# Patient Record
Sex: MALE | Race: WHITE | NOT HISPANIC OR LATINO | Employment: OTHER | URBAN - METROPOLITAN AREA
[De-identification: names, ages, dates, MRNs, and addresses within clinical notes are randomized per-mention and may not be internally consistent; named-entity substitution may affect disease eponyms.]

---

## 2019-03-06 ENCOUNTER — AMBULATORY - HEALTHEAST (OUTPATIENT)
Dept: CARDIAC REHAB | Facility: HOSPITAL | Age: 69
End: 2019-03-06

## 2019-03-06 DIAGNOSIS — Z95.1 S/P CABG (CORONARY ARTERY BYPASS GRAFT): ICD-10-CM

## 2019-03-12 ENCOUNTER — AMBULATORY - HEALTHEAST (OUTPATIENT)
Dept: CARDIAC REHAB | Facility: HOSPITAL | Age: 69
End: 2019-03-12

## 2019-03-12 ENCOUNTER — COMMUNICATION - HEALTHEAST (OUTPATIENT)
Dept: TELEHEALTH | Facility: CLINIC | Age: 69
End: 2019-03-12

## 2019-03-12 DIAGNOSIS — Z95.1 S/P CABG (CORONARY ARTERY BYPASS GRAFT): ICD-10-CM

## 2019-03-12 RX ORDER — NITROGLYCERIN 0.4 MG/1
0.4 TABLET SUBLINGUAL EVERY 5 MIN PRN
Status: SHIPPED | COMMUNITY
Start: 2019-03-12

## 2019-03-12 RX ORDER — METFORMIN HCL 500 MG
1000 TABLET, EXTENDED RELEASE 24 HR ORAL
Status: SHIPPED | COMMUNITY
Start: 2019-03-12

## 2019-03-12 RX ORDER — ASPIRIN 81 MG/1
81 TABLET, CHEWABLE ORAL
Status: SHIPPED | COMMUNITY
Start: 2019-03-12

## 2019-03-12 RX ORDER — CETIRIZINE HYDROCHLORIDE 10 MG/1
10 TABLET ORAL DAILY PRN
Status: SHIPPED | COMMUNITY
Start: 2019-03-12

## 2019-03-12 RX ORDER — CLOPIDOGREL BISULFATE 75 MG/1
75 TABLET ORAL
Status: SHIPPED | COMMUNITY
Start: 2019-03-12

## 2019-03-12 RX ORDER — ACETAMINOPHEN 325 MG/1
325-650 TABLET ORAL EVERY 4 HOURS PRN
Status: SHIPPED | COMMUNITY
Start: 2019-03-12

## 2019-03-12 RX ORDER — NICOTINE 21 MG/24HR
1 PATCH, TRANSDERMAL 24 HOURS TRANSDERMAL EVERY 24 HOURS
Status: SHIPPED | COMMUNITY
Start: 2019-03-12

## 2019-03-12 RX ORDER — ALBUTEROL SULFATE 90 UG/1
2 AEROSOL, METERED RESPIRATORY (INHALATION) EVERY 6 HOURS PRN
Status: SHIPPED | COMMUNITY
Start: 2019-03-12

## 2019-03-13 ENCOUNTER — AMBULATORY - HEALTHEAST (OUTPATIENT)
Dept: CARDIAC REHAB | Facility: HOSPITAL | Age: 69
End: 2019-03-13

## 2019-03-13 DIAGNOSIS — Z95.1 S/P CABG (CORONARY ARTERY BYPASS GRAFT): ICD-10-CM

## 2019-03-18 ENCOUNTER — AMBULATORY - HEALTHEAST (OUTPATIENT)
Dept: CARDIAC REHAB | Facility: HOSPITAL | Age: 69
End: 2019-03-18

## 2019-03-18 DIAGNOSIS — Z95.1 S/P CABG (CORONARY ARTERY BYPASS GRAFT): ICD-10-CM

## 2019-03-25 ENCOUNTER — AMBULATORY - HEALTHEAST (OUTPATIENT)
Dept: CARDIAC REHAB | Facility: HOSPITAL | Age: 69
End: 2019-03-25

## 2019-03-25 DIAGNOSIS — Z95.1 S/P CABG (CORONARY ARTERY BYPASS GRAFT): ICD-10-CM

## 2019-03-27 ENCOUNTER — AMBULATORY - HEALTHEAST (OUTPATIENT)
Dept: CARDIAC REHAB | Facility: HOSPITAL | Age: 69
End: 2019-03-27

## 2019-03-27 DIAGNOSIS — Z95.1 S/P CABG (CORONARY ARTERY BYPASS GRAFT): ICD-10-CM

## 2019-04-01 ENCOUNTER — AMBULATORY - HEALTHEAST (OUTPATIENT)
Dept: CARDIAC REHAB | Facility: HOSPITAL | Age: 69
End: 2019-04-01

## 2019-04-01 DIAGNOSIS — Z95.1 S/P CABG (CORONARY ARTERY BYPASS GRAFT): ICD-10-CM

## 2019-04-03 ENCOUNTER — AMBULATORY - HEALTHEAST (OUTPATIENT)
Dept: CARDIAC REHAB | Facility: HOSPITAL | Age: 69
End: 2019-04-03

## 2019-04-03 DIAGNOSIS — Z95.1 S/P CABG (CORONARY ARTERY BYPASS GRAFT): ICD-10-CM

## 2019-04-08 ENCOUNTER — AMBULATORY - HEALTHEAST (OUTPATIENT)
Dept: CARDIAC REHAB | Facility: HOSPITAL | Age: 69
End: 2019-04-08

## 2019-04-08 DIAGNOSIS — Z95.1 S/P CABG (CORONARY ARTERY BYPASS GRAFT): ICD-10-CM

## 2019-04-10 ENCOUNTER — AMBULATORY - HEALTHEAST (OUTPATIENT)
Dept: CARDIAC REHAB | Facility: HOSPITAL | Age: 69
End: 2019-04-10

## 2019-04-10 DIAGNOSIS — Z95.1 S/P CABG (CORONARY ARTERY BYPASS GRAFT): ICD-10-CM

## 2019-04-15 ENCOUNTER — AMBULATORY - HEALTHEAST (OUTPATIENT)
Dept: CARDIAC REHAB | Facility: HOSPITAL | Age: 69
End: 2019-04-15

## 2019-04-15 DIAGNOSIS — Z95.1 S/P CABG (CORONARY ARTERY BYPASS GRAFT): ICD-10-CM

## 2019-04-17 ENCOUNTER — AMBULATORY - HEALTHEAST (OUTPATIENT)
Dept: CARDIAC REHAB | Facility: HOSPITAL | Age: 69
End: 2019-04-17

## 2019-04-17 DIAGNOSIS — Z95.1 S/P CABG X 3: ICD-10-CM

## 2019-04-18 ENCOUNTER — SURGERY - HEALTHEAST (OUTPATIENT)
Dept: CARDIOLOGY | Facility: CLINIC | Age: 69
End: 2019-04-18

## 2019-04-18 ASSESSMENT — MIFFLIN-ST. JEOR: SCORE: 1592.26

## 2019-04-19 ASSESSMENT — MIFFLIN-ST. JEOR: SCORE: 1564.59

## 2019-04-20 ASSESSMENT — MIFFLIN-ST. JEOR: SCORE: 1557.79

## 2019-04-24 ENCOUNTER — AMBULATORY - HEALTHEAST (OUTPATIENT)
Dept: CARDIAC REHAB | Facility: HOSPITAL | Age: 69
End: 2019-04-24

## 2019-04-24 DIAGNOSIS — Z95.5 S/P CORONARY ARTERY STENT PLACEMENT: ICD-10-CM

## 2019-04-24 DIAGNOSIS — Z95.5 STENTED CORONARY ARTERY: ICD-10-CM

## 2019-04-29 ENCOUNTER — AMBULATORY - HEALTHEAST (OUTPATIENT)
Dept: CARDIAC REHAB | Facility: HOSPITAL | Age: 69
End: 2019-04-29

## 2019-04-29 ENCOUNTER — OFFICE VISIT - HEALTHEAST (OUTPATIENT)
Dept: CARDIOLOGY | Facility: CLINIC | Age: 69
End: 2019-04-29

## 2019-04-29 DIAGNOSIS — E11.9 TYPE 2 DIABETES MELLITUS WITHOUT COMPLICATION, WITHOUT LONG-TERM CURRENT USE OF INSULIN (H): ICD-10-CM

## 2019-04-29 DIAGNOSIS — I50.22 CHRONIC SYSTOLIC HEART FAILURE (H): ICD-10-CM

## 2019-04-29 DIAGNOSIS — I25.5 ISCHEMIC CARDIOMYOPATHY: ICD-10-CM

## 2019-04-29 DIAGNOSIS — I25.83 CORONARY ARTERY DISEASE DUE TO LIPID RICH PLAQUE: ICD-10-CM

## 2019-04-29 DIAGNOSIS — I47.29 NONSUSTAINED VENTRICULAR TACHYCARDIA (H): ICD-10-CM

## 2019-04-29 DIAGNOSIS — I10 ESSENTIAL HYPERTENSION: ICD-10-CM

## 2019-04-29 DIAGNOSIS — Z95.1 S/P CABG X 3: ICD-10-CM

## 2019-04-29 DIAGNOSIS — Z95.5 S/P CORONARY ARTERY STENT PLACEMENT: ICD-10-CM

## 2019-04-29 DIAGNOSIS — I21.4 ACUTE NON-ST ELEVATION MYOCARDIAL INFARCTION (NSTEMI) (H): ICD-10-CM

## 2019-04-29 DIAGNOSIS — E78.5 DYSLIPIDEMIA, GOAL LDL BELOW 70: ICD-10-CM

## 2019-04-29 DIAGNOSIS — I25.10 CORONARY ARTERY DISEASE DUE TO LIPID RICH PLAQUE: ICD-10-CM

## 2019-04-29 ASSESSMENT — MIFFLIN-ST. JEOR: SCORE: 1599.07

## 2019-05-01 ENCOUNTER — AMBULATORY - HEALTHEAST (OUTPATIENT)
Dept: CARDIAC REHAB | Facility: HOSPITAL | Age: 69
End: 2019-05-01

## 2019-05-01 DIAGNOSIS — Z95.5 STENTED CORONARY ARTERY: ICD-10-CM

## 2019-05-01 DIAGNOSIS — I21.4 NSTEMI (NON-ST ELEVATED MYOCARDIAL INFARCTION) (H): ICD-10-CM

## 2019-05-01 DIAGNOSIS — Z95.1 S/P CABG X 3: ICD-10-CM

## 2019-05-01 RX ORDER — METOPROLOL SUCCINATE 25 MG/1
25 TABLET, EXTENDED RELEASE ORAL AT BEDTIME
Status: SHIPPED | COMMUNITY
Start: 2019-05-01

## 2019-05-06 ENCOUNTER — AMBULATORY - HEALTHEAST (OUTPATIENT)
Dept: CARDIAC REHAB | Facility: HOSPITAL | Age: 69
End: 2019-05-06

## 2019-05-06 DIAGNOSIS — I21.4 NSTEMI (NON-ST ELEVATED MYOCARDIAL INFARCTION) (H): ICD-10-CM

## 2019-05-06 DIAGNOSIS — Z95.1 S/P CABG X 3: ICD-10-CM

## 2019-05-06 DIAGNOSIS — Z95.5 STENTED CORONARY ARTERY: ICD-10-CM

## 2019-05-08 ENCOUNTER — AMBULATORY - HEALTHEAST (OUTPATIENT)
Dept: CARDIAC REHAB | Facility: HOSPITAL | Age: 69
End: 2019-05-08

## 2019-05-08 DIAGNOSIS — Z95.1 S/P CABG X 3: ICD-10-CM

## 2019-05-08 DIAGNOSIS — I21.4 NSTEMI (NON-ST ELEVATED MYOCARDIAL INFARCTION) (H): ICD-10-CM

## 2019-05-08 DIAGNOSIS — Z95.5 STENTED CORONARY ARTERY: ICD-10-CM

## 2019-05-13 ENCOUNTER — AMBULATORY - HEALTHEAST (OUTPATIENT)
Dept: CARDIAC REHAB | Facility: HOSPITAL | Age: 69
End: 2019-05-13

## 2019-05-13 DIAGNOSIS — Z95.1 S/P CABG X 3: ICD-10-CM

## 2019-05-13 DIAGNOSIS — I21.4 NSTEMI (NON-ST ELEVATED MYOCARDIAL INFARCTION) (H): ICD-10-CM

## 2019-05-13 DIAGNOSIS — Z95.5 STENTED CORONARY ARTERY: ICD-10-CM

## 2019-05-15 ENCOUNTER — AMBULATORY - HEALTHEAST (OUTPATIENT)
Dept: CARDIAC REHAB | Facility: HOSPITAL | Age: 69
End: 2019-05-15

## 2019-05-15 DIAGNOSIS — Z95.5 STENTED CORONARY ARTERY: ICD-10-CM

## 2019-05-15 DIAGNOSIS — Z95.1 S/P CABG X 3: ICD-10-CM

## 2019-05-15 DIAGNOSIS — I21.4 NSTEMI (NON-ST ELEVATED MYOCARDIAL INFARCTION) (H): ICD-10-CM

## 2019-05-20 ENCOUNTER — AMBULATORY - HEALTHEAST (OUTPATIENT)
Dept: CARDIAC REHAB | Facility: HOSPITAL | Age: 69
End: 2019-05-20

## 2019-05-20 DIAGNOSIS — Z95.5 STENTED CORONARY ARTERY: ICD-10-CM

## 2019-05-20 DIAGNOSIS — Z95.1 S/P CABG X 3: ICD-10-CM

## 2019-05-20 DIAGNOSIS — I21.4 NSTEMI (NON-ST ELEVATED MYOCARDIAL INFARCTION) (H): ICD-10-CM

## 2019-05-22 ENCOUNTER — AMBULATORY - HEALTHEAST (OUTPATIENT)
Dept: CARDIAC REHAB | Facility: HOSPITAL | Age: 69
End: 2019-05-22

## 2019-05-22 DIAGNOSIS — I21.4 NSTEMI (NON-ST ELEVATED MYOCARDIAL INFARCTION) (H): ICD-10-CM

## 2019-05-22 DIAGNOSIS — Z95.1 S/P CABG X 3: ICD-10-CM

## 2019-05-22 DIAGNOSIS — Z95.5 STENTED CORONARY ARTERY: ICD-10-CM

## 2019-05-29 ENCOUNTER — AMBULATORY - HEALTHEAST (OUTPATIENT)
Dept: CARDIAC REHAB | Facility: HOSPITAL | Age: 69
End: 2019-05-29

## 2019-05-29 DIAGNOSIS — I21.4 NSTEMI (NON-ST ELEVATED MYOCARDIAL INFARCTION) (H): ICD-10-CM

## 2019-05-29 DIAGNOSIS — Z95.1 S/P CABG X 3: ICD-10-CM

## 2019-06-03 ENCOUNTER — AMBULATORY - HEALTHEAST (OUTPATIENT)
Dept: CARDIAC REHAB | Facility: HOSPITAL | Age: 69
End: 2019-06-03

## 2019-06-03 DIAGNOSIS — Z95.1 S/P CABG X 3: ICD-10-CM

## 2019-06-03 DIAGNOSIS — I21.4 NSTEMI (NON-ST ELEVATED MYOCARDIAL INFARCTION) (H): ICD-10-CM

## 2019-06-05 ENCOUNTER — AMBULATORY - HEALTHEAST (OUTPATIENT)
Dept: CARDIAC REHAB | Facility: HOSPITAL | Age: 69
End: 2019-06-05

## 2019-06-05 DIAGNOSIS — Z95.5 STENTED CORONARY ARTERY: ICD-10-CM

## 2019-06-05 DIAGNOSIS — Z95.1 S/P CABG X 3: ICD-10-CM

## 2019-06-05 DIAGNOSIS — I21.4 NSTEMI (NON-ST ELEVATED MYOCARDIAL INFARCTION) (H): ICD-10-CM

## 2019-06-10 ENCOUNTER — AMBULATORY - HEALTHEAST (OUTPATIENT)
Dept: CARDIAC REHAB | Facility: HOSPITAL | Age: 69
End: 2019-06-10

## 2019-06-10 DIAGNOSIS — Z95.5 STENTED CORONARY ARTERY: ICD-10-CM

## 2019-06-10 DIAGNOSIS — Z95.1 S/P CABG X 3: ICD-10-CM

## 2019-06-10 DIAGNOSIS — I21.4 NSTEMI (NON-ST ELEVATED MYOCARDIAL INFARCTION) (H): ICD-10-CM

## 2019-06-12 ENCOUNTER — AMBULATORY - HEALTHEAST (OUTPATIENT)
Dept: CARDIAC REHAB | Facility: HOSPITAL | Age: 69
End: 2019-06-12

## 2019-06-12 DIAGNOSIS — Z95.5 STENTED CORONARY ARTERY: ICD-10-CM

## 2019-06-12 DIAGNOSIS — I21.4 NSTEMI (NON-ST ELEVATED MYOCARDIAL INFARCTION) (H): ICD-10-CM

## 2019-06-12 DIAGNOSIS — Z95.1 S/P CABG X 3: ICD-10-CM

## 2019-06-17 ENCOUNTER — AMBULATORY - HEALTHEAST (OUTPATIENT)
Dept: CARDIAC REHAB | Facility: HOSPITAL | Age: 69
End: 2019-06-17

## 2019-06-17 DIAGNOSIS — Z95.5 STENTED CORONARY ARTERY: ICD-10-CM

## 2019-06-17 DIAGNOSIS — I21.4 NSTEMI (NON-ST ELEVATED MYOCARDIAL INFARCTION) (H): ICD-10-CM

## 2019-06-17 DIAGNOSIS — Z95.1 S/P CABG X 3: ICD-10-CM

## 2019-06-19 ENCOUNTER — AMBULATORY - HEALTHEAST (OUTPATIENT)
Dept: CARDIAC REHAB | Facility: HOSPITAL | Age: 69
End: 2019-06-19

## 2019-06-19 DIAGNOSIS — Z95.1 S/P CABG X 3: ICD-10-CM

## 2019-06-19 DIAGNOSIS — Z95.5 STENTED CORONARY ARTERY: ICD-10-CM

## 2019-06-19 DIAGNOSIS — I21.4 NSTEMI (NON-ST ELEVATED MYOCARDIAL INFARCTION) (H): ICD-10-CM

## 2019-06-24 ENCOUNTER — AMBULATORY - HEALTHEAST (OUTPATIENT)
Dept: CARDIAC REHAB | Facility: HOSPITAL | Age: 69
End: 2019-06-24

## 2019-06-24 DIAGNOSIS — Z95.1 S/P CABG X 3: ICD-10-CM

## 2019-06-26 ENCOUNTER — AMBULATORY - HEALTHEAST (OUTPATIENT)
Dept: CARDIAC REHAB | Facility: HOSPITAL | Age: 69
End: 2019-06-26

## 2019-06-26 DIAGNOSIS — Z95.1 S/P CABG X 3: ICD-10-CM

## 2019-06-26 DIAGNOSIS — I21.4 NSTEMI (NON-ST ELEVATED MYOCARDIAL INFARCTION) (H): ICD-10-CM

## 2019-06-26 DIAGNOSIS — Z95.5 STENTED CORONARY ARTERY: ICD-10-CM

## 2019-07-01 ENCOUNTER — AMBULATORY - HEALTHEAST (OUTPATIENT)
Dept: CARDIAC REHAB | Facility: HOSPITAL | Age: 69
End: 2019-07-01

## 2019-07-01 DIAGNOSIS — I21.4 NSTEMI (NON-ST ELEVATED MYOCARDIAL INFARCTION) (H): ICD-10-CM

## 2019-07-01 DIAGNOSIS — Z95.5 STENTED CORONARY ARTERY: ICD-10-CM

## 2019-07-01 DIAGNOSIS — Z95.1 S/P CABG X 3: ICD-10-CM

## 2019-07-03 ENCOUNTER — AMBULATORY - HEALTHEAST (OUTPATIENT)
Dept: CARDIAC REHAB | Facility: HOSPITAL | Age: 69
End: 2019-07-03

## 2019-07-03 DIAGNOSIS — Z95.5 STENTED CORONARY ARTERY: ICD-10-CM

## 2019-07-03 DIAGNOSIS — I21.4 NSTEMI (NON-ST ELEVATED MYOCARDIAL INFARCTION) (H): ICD-10-CM

## 2019-07-08 ENCOUNTER — AMBULATORY - HEALTHEAST (OUTPATIENT)
Dept: CARDIAC REHAB | Facility: HOSPITAL | Age: 69
End: 2019-07-08

## 2019-07-08 DIAGNOSIS — Z95.1 S/P CABG X 3: ICD-10-CM

## 2019-07-10 ENCOUNTER — AMBULATORY - HEALTHEAST (OUTPATIENT)
Dept: CARDIAC REHAB | Facility: HOSPITAL | Age: 69
End: 2019-07-10

## 2019-07-10 DIAGNOSIS — I25.10 CAD (CORONARY ARTERY DISEASE): ICD-10-CM

## 2019-07-10 DIAGNOSIS — Z95.1 S/P CABG X 3: ICD-10-CM

## 2019-07-15 ENCOUNTER — AMBULATORY - HEALTHEAST (OUTPATIENT)
Dept: CARDIAC REHAB | Facility: HOSPITAL | Age: 69
End: 2019-07-15

## 2019-07-15 DIAGNOSIS — Z95.1 S/P CABG X 3: ICD-10-CM

## 2019-07-15 DIAGNOSIS — I21.4 NSTEMI (NON-ST ELEVATED MYOCARDIAL INFARCTION) (H): ICD-10-CM

## 2019-07-15 DIAGNOSIS — Z95.5 STENTED CORONARY ARTERY: ICD-10-CM

## 2019-07-22 ENCOUNTER — AMBULATORY - HEALTHEAST (OUTPATIENT)
Dept: CARDIAC REHAB | Facility: HOSPITAL | Age: 69
End: 2019-07-22

## 2019-07-22 DIAGNOSIS — I21.4 NSTEMI (NON-ST ELEVATED MYOCARDIAL INFARCTION) (H): ICD-10-CM

## 2019-07-22 DIAGNOSIS — Z95.1 S/P CABG X 3: ICD-10-CM

## 2019-08-01 ENCOUNTER — AMBULATORY - HEALTHEAST (OUTPATIENT)
Dept: CARDIAC REHAB | Facility: HOSPITAL | Age: 69
End: 2019-08-01

## 2019-08-01 DIAGNOSIS — I25.10 CAD (CORONARY ARTERY DISEASE): ICD-10-CM

## 2021-05-25 ENCOUNTER — RECORDS - HEALTHEAST (OUTPATIENT)
Dept: ADMINISTRATIVE | Facility: CLINIC | Age: 71
End: 2021-05-25

## 2021-05-26 ENCOUNTER — RECORDS - HEALTHEAST (OUTPATIENT)
Dept: ADMINISTRATIVE | Facility: CLINIC | Age: 71
End: 2021-05-26

## 2021-05-27 NOTE — PROGRESS NOTES
04/17/19 1100   Nustep   Symptoms Exercise stopped to see the dietitian and during the dietitian consult pt had 11 second run of VT, without sx's./67 and O2 sat was98%.  Pt now reports he had a episode of SOB and chest discomfort this am. Code 9 called and pt was sent to the ED for further evaluation. Strips scanned into Epic.   See Doc Flowsheet

## 2021-05-27 NOTE — PROGRESS NOTES
ITP ASSESSMENT   Assessment Day: 30 Day    Session Number: 9  Precautions: sternal precautions    Diagnosis: CAB    Risk Stratification: High    Referring Provider: Parker Archer MD  EXERCISE  Exercise Assessment: Reassessment       6 Minute Walk Test   Pre   Pre Exercise HR: 64                    Pre Exercise BP: 103/60      Peak  Peak HR: 68                   Peak BP: 164/79    Peak feet: 600    Peak O2 SAT: 96    Peak RPE: 12    Peak MPH: 1.14      Symptoms:  Peak Symptoms: right hip pain      5 mins. Post  5 Min Post HR: 58    5 Min Post BP: 149/68                           Exercise Plan  Goals Next 30 days  ADL'S: Pt. will  yard, spring cleaning following surgical precautions.    Leisure: [place] and tolerate a day at the [employer]. Pt. will tolerated increased U/E activity after 4/19/19.      Education Goals: All goals in this section met    Education Goals Met: Patient can state cardiac s/s and appropriate emergency response.;Has system for taking medication.;Medication review.                          Goals Met  Initial ADL's goals met: Pt. has returned to driving.    Initial Leisure goals met: Pt. is able to tolerate 2-3 flights slowly without sx.    No data recorded  Initial Progression: Pt. is tolerating a 3 met level. He is feeling much better with the discontinuation of many meds.      Exercise Prescription  Exercise Mode: Treadmill;Bike;Nustep;Hallway Walking    Frequency: 2-3x/week    Duration: 40-45 min    Intensity / THR: 20-30 beats above resting heart rate    RPE 11-14  Progression / Met level: 4-5    Resistive Training?: Yes (will begin after 4/19/19)      Current Exercise (mins/week): 150      Interventions  Home Exercise:  Mode: walking    Frequency: daily    Duration: 30 min. daily      Education Material : Educational videos;Provide written material;Individual education and counseling;Offer educational classes      Education Completed  Exercise  "Education Completed: Signs and Symptoms;Medication review;RPE;Warm up/cool down;BP/HR Reponse to exercise;Benefits of Exercise;End point of exercise;Emergency Plan              Exercise Follow-up/Discharge  Follow up/Discharge: Pt. states he will walk daily, stating its much easier with the weather being nice.           NUTRITION  Nutrition Assessment: Reassessment      Nutrition Risk Factors:  Nutrition Risk Factors: Diabetes;Dyslipidemia  HbA1c: 6.6  Monitors blood sugar at home: No  Cholesterol: NA  LDL: 79  HDL: NA  Triglycerides: NA      Nutrition Plan  Interventions  No data recorded  Other Nutrition Intervention: Diet Class;Therapist/Pt Discussion;Educational Videos;Provide with Written Material      Education Completed  Nutrition Education Completed: Risk factor overview      Goals  Nutrition Goals (Next 30 days): Patient will follow a low saturated fat diet;Patient will follow a low sodium diet      Goals Met  Nutrition Goals Met: Patient can identify their risk factors for CAD;Completed Nutritional Risk Screen;Provided Rate your Plate Survey;Reviewed Dietitian schedule;Patient knows appropriate portion size      Height, Weight, and  BMI  Weight: 174 lb (78.9 kg)  Height: 6' 1\" (1.854 m)  BMI: 22.96      Nutrition Follow-up  Follow-up/Discharge: Pt. will meet with dietitian .       Other Risk Factors  Other Risk Factor Assessment: Reassessment      HTN Risk Factor: Hypertension      Pre Exercise BP: 106/60  Post Exercise BP: 110/60      Hypertension Plan  Goals  HTN Goals: Follow low sodium diet      Goals Met  HTN Goals Met: Exercises regularly;Take medication as prescribed      HTN Interventions  HTN Interventions: Diet consult;Provide written material;Therapist/patient discussion;Offer educational videos;Offer educational classes      HTN Education Completed  HTN Education Completed: Risk factor overview      Tobacco Risk Factor: Tobacco      Initial Use:: 1 ppd  Current Use:: 0      Tobacco " Plan  Tobacco Goals  Tobacco Goals: Patient remain tobacco free      Goals Met  Tobacco Goals Met: Patient remain tobacco free      Tobacco Interventions  Tobacco Interventions: Therapist/patient discussion;Provide written material;Offer educational videos;Offer class on risk factor modification      Tobacco Education Completed  Tobacco Education Completed: Identifies triggers;Health benefits of tobacco cessation;Risk factor overview      Risk Factor Follow-up   Follow-up/Discharge: Pt. is committed to smoking cessation.         PSYCHOSOCIAL  Psychosocial Assessment: Reassessment       [facility] Q of L Summary Score: 22      EZEQUIEL-D Score: 5      Psychosocial Risk Factor: NA      Psychosocial Plan  Interventions  Interventions: Offer educational videos and classes;Provide written material;Individual education and counseling      Goals  Goals (Next 30 days): Practicing stress management skills;Identify stressors      Goals Met  Goals Met: Identified Support system      Psychosocial Follow-up  Follow-up/Discharge: Pt. states he is doing well.           Patient involved in Goal setting?: No      Signature: _____________________________________________________________    Date: __________________    Time: __________________See Doc Flowsheet

## 2021-05-27 NOTE — PROGRESS NOTES
Abdias Glover has participated in 6 sessions of Phase II Cardiac Rehab.    Progress Report:   Cardiac Rehab Treatment Progress Report    3/25/2019 3/27/2019   Weight    174 lbs 174 lbs   Pre Exercise  HR    66 70   Pre Exercise BP    122/68 106/64   Pre Blood Sugar (mg/dl)    - -   Nustep Peak Heart Rate    74 -   Nustep Peak Blood Pressure    122/70 -   Heart Rate    72 68   Post Exercise BP    140/72 106/64   ECG    SR/frequent/bigeminal PVC SR frequent PVCs,trigemony , couplets, triplets- more frequent today-pt. asymp.   Total Exercise Minutes    30 40     Please note there has been an increase in ventricular ectopy with discontinuation of metoprolol. Pt. Is asymptomomatic with increased ectopy .    Current Status:  Currently exercising without complaints or symptoms.    If Physician recommends change in treatment plan, please place orders.        __________________________________________________      _____________  Signature                                                                                                  Date

## 2021-05-27 NOTE — PROGRESS NOTES
Summary of Event: Episode of VT        Dispensation of Patient:  Sent to Emergency Room      Parameter On Arrival With Event At Departure   Time 1130 1201 1220   Heart Rate 77 165 80   Rhythm SR with Freq PVC's 11 sec run of VT SR with freq PVC's   Blood Pressure 100/60 120//60 124/67   Oxygen Sats.  98% 98%   Blood Sugar mg/dl      Other:  Pt denies sx's. Pt reports he had an episode of shortness of breath and chest discomfort at 4 am      Follow-up of Outcome: Sent to ED  See Doc Flowsheet

## 2021-05-27 NOTE — PROGRESS NOTES
Abdias Glover has participated in 5 sessions of Phase II Cardiac Rehab.    Progress Report:   Cardiac Rehab Treatment Progress Report 3/12/2019 3/13/2019 3/18/2019 3/18/2019 3/25/2019   Weight 171 lbs 172 lbs 174 lbs 174 lbs 174 lbs   Pre Exercise  HR 64 64 63 - 66   Pre Exercise /60 100/66 96/60 - 122/68   Pre Blood Sugar (mg/dl) NA - NA - Metformin Only - -   Nustep Peak Heart Rate 71 68 - - 74   Nustep Peak Blood Pressure 142/78 140/82 - - 122/70   Heart Rate 58 65 65 - 72   Post Exercise /72 110/72 116/65 - 140/72   ECG SB-SR, frequent  PVCs with bouts of bigeminal SR, frequent PVCs andcouplets SR freq PVC multifocal, Trigemony - SR/frequent/bigeminal PVC   Total Exercise Minutes 16 35 4 - 30         Current Status:  Code 9 called on 3/18 for lightheadedness. Per pt, he has completely discharged metoprolol.     If Physician recommends change in treatment plan, please place orders.        __________________________________________________      _____________  Signature                                                                                                  DateSee Doc Flowsheet

## 2021-05-28 NOTE — PROGRESS NOTES
Abdias Glover has participated in 20 sessions of Phase II Cardiac Rehab.    Progress Report:   Cardiac Rehab Treatment Progress Report 5/6/2019 5/8/2019 5/13/2019 5/15/2019 5/20/2019   Weight 174 lbs 172 lbs 172 lbs 171 lbs 173 lbs   Pre Exercise  HR 70 79 64 67 60   Pre Exercise /62 98/60 102/64 94/54 118/60   Pre Blood Sugar (mg/dl) - - - - -   Nustep Peak Heart Rate 84 92 83 76 76   Nustep Peak Blood Pressure - - 118/62 118/60 122/62   Heart Rate 64 75 74 67 63   Post Exercise /60 104/60 94/56 100/50 102/62   ECG SR, PVCs SR, Occ PVC's SR with PVCs SR with occ PVC's SR, Occ PVC's   Total Exercise Minutes 40 35 30 40 40         Current Status:  Currently exercising without complaints or symptoms.    If Physician recommends change in treatment plan, please place orders.        __________________________________________________      _____________  Signature                                                                                                  DateSee Doc Flowsheet

## 2021-05-28 NOTE — PROGRESS NOTES
Abdias Glover has participated in 13 sessions of Phase II Cardiac Rehab.    Progress Report:   Cardiac Rehab Treatment Progress Report 4/18/2019 4/18/2019 4/19/2019 4/20/2019 4/24/2019   Weight 165 lbs 6 oz 170 lbs 8 oz 164 lbs 6 oz 162 lbs 14 oz 173 lbs   Pre Exercise  HR - - - - 59   Pre Exercise BP - - - - 118/64   Pre Blood Sugar (mg/dl) - - - - -   Nustep Peak Heart Rate - - - - 68   Nustep Peak Blood Pressure - - - - 134/68   Heart Rate - - - - 59   Post Exercise BP - - - - 110/60   ECG - - - - SR with rare PVC, no VT   Total Exercise Minutes - - - - 35         Current Status:  Currently exercising without complaints or symptoms.    If Physician recommends change in treatment plan, please place orders.        __________________________________________________      _____________  Signature                                                                                                  DateSee Doc Flowsheet

## 2021-05-28 NOTE — PROGRESS NOTES
Abdias Glover has participated in 13 sessions of Phase II Cardiac Rehab.    Progress Report:   Cardiac Rehab Treatment Progress Report 4/18/2019 4/18/2019 4/19/2019 4/20/2019 4/24/2019   Weight 165 lbs 6 oz 170 lbs 8 oz 164 lbs 6 oz 162 lbs 14 oz 173 lbs   Pre Exercise  HR - - - - 59   Pre Exercise BP - - - - 118/64   Pre Blood Sugar (mg/dl) - - - - -   Nustep Peak Heart Rate - - - - 68   Nustep Peak Blood Pressure - - - - 134/68   Heart Rate - - - - 59   Post Exercise BP - - - - 110/60   ECG - - - -11 sec run of V-tach. Code 9 called SR with rare PVC, no VT   Total Exercise Minutes - - - - 35         Current Status:  Currently exercising without complaints or symptoms.    If Physician recommends change in treatment plan, please place orders.        __________________________________________________      _____________  Signature                                                                                                  DateSee Doc Flowsheet

## 2021-05-29 NOTE — PROGRESS NOTES
ITP ASSESSMENT   Assessment Day: 90 Day    Session Number: 22  Precautions: Sternal/V-Tach    Diagnosis: CAB    Risk Stratification: High    Referring Provider: Parker Archer MD   ITP: Dr. French  EXERCISE  Exercise Assessment: Reassessment    Pt tolerates 40 minutes of exercise at 3 mets without any complaints.                          Exercise Plan  Goals Next 30 days  Leisure: Start home walking program.    Work: Travel for class reunion.    Education Goals: All goals in this section met    Education Goals Met: Patient can state cardiac s/s and appropriate emergency response.;Has system for taking medication.;Medication review.                        Goals Met  60 day ADL'S goals met: Goal Met - Pt is working on spring yard clean up    60 day Leisure goals met: Goal Not Met - Pt not walking for exercise    60 day Work goals met: Goal Met - Attending CR regularly    60 Day Progression: Pt is progressing well towards goals.    30 day ADL'S goals met: Goal not met    30 day Leisure goals met: Goal met.    30 Day Progression: Pt is progressing, new goals set.    Initial ADL's goals met: Pt. has returned to driving.    Initial Leisure goals met: Pt. is able to tolerate 2-3 flights slowly without sx.    Initial Progression: Pt. is tolerating a 3 met level. He is feeling much better with the discontinuation of many meds.    Exercise Prescription  Exercise Mode: Treadmill;Bike;Nustep;Hallway Walking    Frequency: 2x/week    Duration: 40 Minutes    Intensity / THR: 20-30 beats above resting heart rate    RPE 11-14  Progression / Met level: 2.5-3.5    Resistive Training?: Yes    Current Exercise (mins/week): 10    Interventions  Home Exercise:  Mode: Walk    Frequency: 5-6x/week    Duration: 30 Minutes    Education Material : Educational videos;Provide written material;Individual education and counseling;Offer educational classes    Education Completed  Exercise Education Completed: Signs and Symptoms;Medication  "review;RPE;Warm up/cool down;BP/HR Reponse to exercise;Benefits of Exercise;End point of exercise;Emergency Plan;Home Exercise;FITT Principles            Exercise Follow-up/Discharge  Follow up/Discharge: Reviewed home exercise program. Encouraged to increase as tolerated.   NUTRITION  Nutrition Assessment: Reassessment    Nutrition Risk Factors:  Nutrition Risk Factors: Diabetes;Dyslipidemia  HbA1c: 6.6  Monitors blood sugar at home: No  Cholesterol: NA  LDL: 79  HDL: NA  Triglycerides: NA    Nutrition Plan  Interventions  Other Nutrition Intervention: Diet Class;Therapist/Pt Discussion;Educational Videos;Provide with Written Material    Initial Rate Your Plate Score: 44    Education Completed  Nutrition Education Completed: Low Saturated fat diet;Low sodium diet    Goals  Nutrition Goals (Next 30 days): Patient will follow a low sodium diet;Patient will follow a low saturated fat diet    Goals Met  Nutrition Goals Met: Patient follows a low sodium diet;Patient has lost weight    Height, Weight, and  BMI  Weight: 173 lb (78.5 kg)  Height: 6' 1\" (1.854 m)  BMI: 22.83    Nutrition Follow-up  Follow-up/Discharge: pt may need to see RD again, cardiac event during visit         Other Risk Factors  Other Risk Factor Assessment: Reassessment    HTN Risk Factor: Hypertension    Pre Exercise BP: 98/62  Post Exercise BP: 100/64    Hypertension Plan  Goals  HTN Goals: Patient demonstrates understanding of HTN, no goals identified for the next 30 days    Goals Met  HTN Goals Met: Follow low sodium diet;Take medication as prescribed;Exercises regularly    HTN Interventions  HTN Interventions: Diet consult;Provide written material;Therapist/patient discussion;Offer educational videos;Offer educational classes    HTN Education Completed  HTN Education Completed: Risk factor overview      Tobacco Risk Factor: Tobacco    Initial Use:: 1 ppd  Current Use:: 0    Tobacco Plan  Tobacco Goals  Tobacco Goals: Patient remain tobacco " free    Goals Met  Tobacco Goals Met: Patient remain tobacco free    Tobacco Interventions  Tobacco Interventions: Therapist/patient discussion;Provide written material;Offer educational videos;Offer class on risk factor modification    Tobacco Education Completed  Tobacco Education Completed: Identifies triggers;Health benefits of tobacco cessation;Risk factor overview    Risk Factor Follow-up   Follow-up/Discharge: Encouraged education classes.   PSYCHOSOCIAL  Psychosocial Assessment: Reassessment    Psychosocial Risk Factor: NA    Psychosocial Plan  Interventions  Interventions: Offer educational videos and classes;Provide written material;Individual education and counseling    Education Completed  Education Completed: Relaxation/Coping Techniques;S/S of depression;Effects of stress on body    Goals  Goals (Next 30 days): Practicing stress management skills;Identify stressors    Goals Met  Goals Met: Identified Support system;Oriented to stress management classes;Identify stressors    Psychosocial Follow-up  Follow-up/Discharge: Pt reports his stress level is no worse than before. States he is doing well.             Patient involved in Goal setting?: Yes      Signature: _____________________________________________________________    Date: __________________    Time: __________________

## 2021-05-29 NOTE — PROGRESS NOTES
ITP ASSESSMENT   Assessment Day: 120 Day    Session Number: 29  Precautions: Sternal/V-Tach    Diagnosis: CAB    Risk Stratification: High    Referring Provider: Parker Archer MD  EXERCISE  Exercise Assessment: Reassessment    Pt continues to participate in outpatient cardiac rehab 2x/week. He is not participating in home aerobic exercises, we continue to encourage him to do so.   He has expressed interest in our phase 3 program, encouraged him to sign up.                            Exercise Plan  Goals Next 30 days  ADL'S: Get Phase 3 rehab set up so he can roll in at discharge    Leisure: Start home walking.    Work: verbalize understanding of home discharge instructions.       Education Goals: All goals in this section met    Education Goals Met: Patient can state cardiac s/s and appropriate emergency response.;Has system for taking medication.;Medication review.                          Goals Met  90 day Leisure goals met: Not met. He is not participating in home exercise.    90 day Work goals met: met. He did travel for his class reunion.     No data recorded    60 day ADL'S goals met: Goal Met - Pt is working on spring yard clean up    60 day Leisure goals met: Goal Not Met - Pt not walking for exercise    60 day Work goals met: Goal Met - Attending CR regularly    60 Day Progression: Pt is progressing well towards goals.      30 day ADL'S goals met: Goal not met    30 day Leisure goals met: Goal met.    No data recorded  30 Day Progression: Pt is progressing, new goals set.      Initial ADL's goals met: Pt. has returned to driving.    Initial Leisure goals met: Pt. is able to tolerate 2-3 flights slowly without sx.    No data recorded  Initial Progression: Pt. is tolerating a 3 met level. He is feeling much better with the discontinuation of many meds.      Exercise Prescription  Exercise Mode: Treadmill;Bike;Nustep;Hallway Walking    Frequency: 2x/week    Duration: 40 miniutes    Intensity / THR:  "20-30 beats above resting heart rate    RPE 11-14  Progression / Met level: 3.5    Resistive Training?: Yes      Current Exercise (mins/week): 80      Interventions  Home Exercise:  Mode: walk    Frequency: 5-6x/week    Duration: 30 minutes      Education Material : Educational videos;Provide written material;Individual education and counseling;Offer educational classes      Education Completed  Exercise Education Completed: Signs and Symptoms;Medication review;RPE;Warm up/cool down;BP/HR Reponse to exercise;Benefits of Exercise;End point of exercise;Emergency Plan;Home Exercise;FITT Principles              Exercise Follow-up/Discharge  Follow up/Discharge: Reviewed ph 3 and had him put his name in for a group at discharge.   NUTRITION  Nutrition Assessment: Reassessment      Nutrition Risk Factors:  Nutrition Risk Factors: Diabetes;Dyslipidemia  HbA1c: 6.7  Monitors blood sugar at home: No  Cholesterol: 99  LDL: 67  HDL: 32  Triglycerides: 98      Nutrition Plan  Interventions  No data recorded  Other Nutrition Intervention: Diet Class;Therapist/Pt Discussion    Initial Rate Your Plate Score: 44      Education Completed  Nutrition Education Completed: Low Saturated fat diet;Low sodium diet      Goals  Nutrition Goals (Next 30 days): Patient will follow a low sodium diet;Patient will follow a low saturated fat diet      Goals Met  Nutrition Goals Met: Patient follows a low sodium diet;Patient has lost weight      Height, Weight, and  BMI  Weight: 178 lb (80.7 kg)  Height: 6' 1\" (1.854 m)  BMI: 23.49      Nutrition Follow-up  Follow-up/Discharge: Current lipid panel from 5/21/19 was reviewed with patient.         Other Risk Factors  Other Risk Factor Assessment: Reassessment      HTN Risk Factor: Hypertension      Pre Exercise BP: 112/60  Post Exercise BP: 112/64      Hypertension Plan  Goals  HTN Goals: Patient demonstrates understanding of HTN, no goals identified for the next 30 days      Goals Met  HTN Goals Met: " Follow low sodium diet;Take medication as prescribed;Exercises regularly      HTN Interventions  HTN Interventions: Diet consult;Provide written material;Therapist/patient discussion;Offer educational videos;Offer educational classes      HTN Education Completed  HTN Education Completed: Risk factor overview      Tobacco Risk Factor: Tobacco      Initial Use:: 1 ppd  Current Use:: 0      Tobacco Plan  Tobacco Goals  Tobacco Goals: Patient remain tobacco free      Goals Met  Tobacco Goals Met: Patient remain tobacco free      Tobacco Interventions  Tobacco Interventions: Therapist/patient discussion;Provide written material;Offer educational videos;Offer class on risk factor modification      Tobacco Education Completed  Tobacco Education Completed: Identifies triggers;Health benefits of tobacco cessation;Risk factor overview      Risk Factor Follow-up   Follow-up/Discharge: continued to encourage home exercise.      PSYCHOSOCIAL  Psychosocial Assessment: Reassessment       [facility] Q [facility] L Summary Score: 22      No data recorded    Psychosocial Risk Factor: NA      Psychosocial Plan  Interventions  Interventions: Offer educational videos and classes;Provide written material;Individual education and counseling      Education Completed  Education Completed: Relaxation/Coping Techniques;S/S of depression;Effects of stress on body      Goals  Goals (Next 30 days): Practicing stress management skills;Identify stressors      Goals Met  Goals Met: Identified Support system;Oriented to stress management classes;Identify stressors      Psychosocial Follow-up  Follow-up/Discharge: Pt reports that stress is not a current cardiac risk factor at this time.             Patient involved in Goal setting?: Yes      Signature: _____________________________________________________________    Date: __________________    Time: __________________See Doc Flowsheet

## 2021-05-30 NOTE — PROGRESS NOTES
Abdias Glover has participated in 31 sessions of Phase II Cardiac Rehab.    Progress Report:   Cardiac Rehab Treatment Progress Report 6/17/2019 6/19/2019 6/24/2019 6/26/2019 7/1/2019   Weight 174 lbs 10 oz 176 lbs 178 lbs 178 lbs 178 lbs   Pre Exercise  HR 64 57 64 55 65   Pre Exercise /60 100/68 112/60 118/66 120/78   Nustep Peak Heart Rate 84 86 83 84 72   Nustep Peak Blood Pressure 134/84 120/64 134/70 146/60 130/66   Heart Rate 66 66 73 60 68   Post Exercise /64 112/64 104/64 126/70 110/56   ECG ST, rare PVC SR, PVCs SR/PVC SR/PVC's SR with PVCs   Total Exercise Minutes 45 45 45 50 40         Current Status:  Currently exercising without complaints or symptoms.    If Physician recommends change in treatment plan, please place orders.        __________________________________________________      _____________  Signature                                                                                                  Date

## 2021-05-30 NOTE — PROGRESS NOTES
ITP ASSESSMENT   Assessment Day: 150 Day    Session Number: 36  Precautions: Sternal/VTach    Diagnosis: CAB    Risk Stratification: High    Referring Provider: Parker Archer MD   ITP: Dr. French  EXERCISE  Exercise Assessment: Discharge    6 Minute Walk Test   Pre   Pre Exercise HR: 59                    Pre Exercise BP: 119/50    Peak  Peak HR: 82                   Peak BP: 185/84    Peak feet: 1200    Peak O2 SAT: 97    Peak RPE: 12    Peak MPH: 2.27    Symptoms:  Peak Symptoms: Hip and Calf discomfort    5 mins. Post  5 Min Post HR: 66    5 Min Post BP: 100/60                         Exercise Plan  Goals Next 30 days  ADL'S: Get Phase 3 rehab set up so he can roll in at discharge    Leisure: Start home walking.    Work: verbalize understanding of home discharge instructions.     Education Goals: All goals in this section met    Education Goals Met: Patient can state cardiac s/s and appropriate emergency response.;Has system for taking medication.;Medication review.                        Goals Met  120 day ADL'S goals met: Goal Met - Starts Phase III in August    120 day Leisure goals met: Goal Met - Walks    120 day Work goals met: Goal Met - Understands discharge    120 Day Progress: Pt is progressing well.    90 day Leisure goals met: Not met. He is not participating in home exercise.    90 day Work goals met: met. He did travel for his class reunion.     60 day ADL'S goals met: Goal Met - Pt is working on spring yard clean up    60 day Leisure goals met: Goal Not Met - Pt not walking for exercise    60 day Work goals met: Goal Met - Attending CR regularly    60 Day Progression: Pt is progressing well towards goals.    30 day ADL'S goals met: Goal not met    30 day Leisure goals met: Goal met.    30 Day Progression: Pt is progressing, new goals set.    Exercise Prescription  Exercise Mode: Treadmill;Bike;Nustep;Hallway Walking    Frequency: 2x/week    Duration: 40 miniutes    Intensity / THR: 20-30  "beats above resting heart rate    RPE 11-14  Progression / Met level: 3.5    Resistive Training?: Yes    Current Exercise (mins/week): 100    Interventions  Home Exercise:  Mode: Walk    Frequency: 5-7x/week    Duration: 30-40 Minutes    Education Material : Educational videos;Provide written material;Individual education and counseling;Offer educational classes    Education Completed  Exercise Education Completed: Stretching;Strength training;Cardiac Anatomy;Signs and Symptoms;Medication review;RPE;Emergency Plan;Home Exercise;Warm up/cool down;FITT Principles;BP/HR Reponse to exercise;Benefits of Exercise;End point of exercise            Exercise Follow-up/Discharge  Follow up/Discharge: Reviewed home exercise program. Pt is to start Phase III Cardiac Rehab in August.   NUTRITION  Nutrition Assessment: Discharge    Nutrition Risk Factors:  Nutrition Risk Factors: Diabetes;Dyslipidemia  HbA1c: 6.7  Monitors blood sugar at home: No  Cholesterol: 99  LDL: 67  HDL: 32  Triglycerides: 98    Nutrition Plan  Interventions  No data recorded  Other Nutrition Intervention: Diet Class;Therapist/Pt Discussion;Educational Videos;Provide with Written Material    Initial Rate Your Plate Score: 44    Pre Initial Rate Your Plate Score: 44   Follow-Up Rate Your Plate Score: 44    Education Completed  Nutrition Education Completed: Low Saturated fat diet;Low sodium diet    Goals  Nutrition Goals (Next 30 days): Patient will follow a low sodium diet;Patient will follow a low saturated fat diet    Goals Met  Nutrition Goals Met: Patient follows a low sodium diet;Patient has lost weight    Height, Weight, and  BMI  Weight: 179 lb (81.2 kg)  Height: 6' 1\" (1.854 m)  BMI: 23.62    Pre BMI: 23.62     Nutrition Follow-up  Follow-up/Discharge: Current lipid panel from 5/21/19 was reviewed with patient.         Other Risk Factors  Other Risk Factor Assessment: Discharge    HTN Risk Factor: Hypertension    Pre Exercise BP: 114/60  Post " Exercise BP: 100/60    Hypertension Plan  Goals  HTN Goals: Patient demonstrates understanding of HTN, no goals identified for the next 30 days    Goals Met  HTN Goals Met: Follow low sodium diet;Take medication as prescribed;Exercises regularly    HTN Interventions  HTN Interventions: Diet consult;Provide written material;Therapist/patient discussion;Offer educational videos;Offer educational classes    HTN Education Completed  HTN Education Completed: Risk factor overview    Tobacco Risk Factor: Tobacco    Initial Use:: 1 ppd  Current Use:: 0    Tobacco Plan  Tobacco Goals  Tobacco Goals: Patient remain tobacco free  Goals Met  Tobacco Goals Met: Patient remain tobacco free    Tobacco Interventions  Tobacco Interventions: Therapist/patient discussion;Provide written material;Offer educational videos;Offer class on risk factor modification    Tobacco Education Completed  Tobacco Education Completed: Identifies triggers;Health benefits of tobacco cessation;Risk factor overview    Risk Factor Follow-up   Follow-up/Discharge: Encouraged to continue home exercise and managing risk factors.   PSYCHOSOCIAL  Psychosocial Assessment: Discharge    [facility] Q [facility] L Summary Score: 14    Pre [facility] Q of L Summary Score: 14     PHQ-9 Total Score: 5 (EZEQUIEL-D)    Pre PHQ-9 Total Score: 5(EZEQUIEL-D)     Psychosocial Risk Factor: NA    Psychosocial Plan  Interventions  If PHQ-9 is >9, send letter to MD  Interventions: Offer educational videos and classes;Provide written material;Individual education and counseling    Education Completed  Education Completed: Relaxation/Coping Techniques;S/S of depression;Effects of stress on body    Goals  Goals (Next 30 days): Practicing stress management skills;Identify stressors    Goals Met  Goals Met: Identified Support system;Oriented to stress management classes;Identify stressors    Psychosocial Follow-up  Follow-up/Discharge: Pt states he manages stress well currently.              Patient involved in Goal setting?: Yes      Signature: _____________________________________________________________    Date: __________________    Time: __________________

## 2021-05-30 NOTE — PATIENT INSTRUCTIONS - HE
[facility] Home Exercise Program    Exercise Goal:  Modality Duration Intensity   Warm-up 5 MINUTES SLOW   Walk 30-40 MINUTES RPE 11-13    Bike 30-40 MINUTES LEVEL 7, RPE 11-13   Cool Down 5 MINUTES SLOW   Strength AS TOLERATED RPE 11-13, 1-3 SETS, 12-15 REPS     Target Heart Rate: 20-30 BEATS PER MINUTE ABOVE RESTING HEART RATE    Perceived exertion  (RPE)  Julio Cesar Scale   6  7      Very, very light  8  9      Very light  10  11    Fairly light  12  13    Somewhat hard  14  15    Hard  16  17    Very hard  18  19    Very, very hard  20     Special Comments/ Recommendations:  -Lipid Profile with Physician - Cholesterol Levels  -Heart Healthy Diet - Low Sodium/Low Fat Diet  -Continue Medications and Exercise as prescribed.    Stop Exercise!!! If any of the following occur:    Angina/chest pain    Dizziness    Excessive perspiration/cold sweats    Abnormal shortness of breath    Changes in heart rate (slow, fast, irregular)    Sudden fatigue or numbness    Nausea    Also...    Avoid extreme temperatures - exercise indoors if necessary    Wait at least 1 hour after a meal before strenuous activity    Do not exercise if you have a fever or are ill    Wear comfortable, supportive athletic clothing

## 2021-05-31 NOTE — PROGRESS NOTES
Cardiac Rehab  Phase III    SOC Date: 8/1/19    Diagnosis: CAD   Procedure: CABG x 3  Date of Onset: 2/22/19  ICD/Pacemaker: No Parameters: NA  ECG History: SR/ST, Vtach EF%:55-60  Past Medical History:   Past Medical History:   Diagnosis Date     COPD (chronic obstructive pulmonary disease) (H)      Coronary artery disease due to lipid rich plaque 2/21/2019    Coronary angiogram 2/21/2019 Conclusions: 1. Severe 99% distal Left Main stenosis involving ostial LAD and ostial LCx. 2. RCA has 100% proximal occlusion. Distal RPDA/RPLA fill via left to right and right to right collaterals. 3. LVEDP 10mmHg      DM II (diabetes mellitus, type II), controlled (H)      Dyslipidemia, goal LDL below 70 4/1/2016     Essential hypertension      Ischemic cardiomyopathy 03/2019    EF 35-40%     Peripheral vascular disease (H)      Patient Active Problem List   Diagnosis     Nonsustained ventricular tachycardia (H)     Coronary artery disease due to lipid rich plaque     COPD (chronic obstructive pulmonary disease) (H)     Essential hypertension     Dyslipidemia, goal LDL below 70     Ischemic cardiomyopathy     Type 2 diabetes mellitus without complication (H)     Acute non-ST elevation myocardial infarction (NSTEMI) (H)     Chronic systolic heart failure (H)         Precautions/ Physical Limitations: Standard  Oxygen: No    Social History  Living Accommodations: Home Steps: Yes  Support people at home: No   Marital Status:   Family is able to assist with cares  [employer]/Community involvement: yes  Recreation/Hobbies: remote control drones/helicopters, home repair projects    Current Activity Level  Mobility status: None  Self Cares/ ADL's: Independent  Home management: Independent  Driving: Independent  Sleeping Pattern: good   Appetite: good     Pain  Intensity: (0-10 scale) 0      Home exercise/Equipment: Weights    Psychosocial/ Emotional Health  1. In the past 12 months, have you been in a relationship where you have been  abused physically, emotionally, sexually or financially? No  notified: NA  2. Who do you turn to for emotional support?: Daughters  3. Do you have cultural or spiritual needs? No  4. Have there been any major life changes in the past 12 months? No

## 2021-05-31 NOTE — PROGRESS NOTES
Cardiac Rehab  Phase III Treatment Plan    Risk Factors  Smoking (currently smoke free)  , High Cholesterol  , Hypertension   and Diabetes      Exercise Prescription  THR: 20-30 beats above resting  Frequency: 2x/week  Duration: 40 minutes  RPE: 11-14      Phase 3:  Phase 3 Goal:  Patient will maintain 3.5-4.5 met level for 40 minutes of continuous/interval exercise. and Phase 3 Plan:  Adjust therapy according to patient tolerance, vital signs and symptoms. Assess functional status and recommend changes as needed.    Primary MD: Gianni  Cardiologist: Morgan

## 2021-06-02 VITALS — WEIGHT: 172 LBS

## 2021-06-02 VITALS — WEIGHT: 174 LBS

## 2021-06-02 VITALS — WEIGHT: 173 LBS | BODY MASS INDEX: 22.82 KG/M2

## 2021-06-02 VITALS — BODY MASS INDEX: 21.59 KG/M2 | WEIGHT: 162.9 LBS | HEIGHT: 73 IN

## 2021-06-02 VITALS — WEIGHT: 173 LBS

## 2021-06-02 VITALS — WEIGHT: 171 LBS

## 2021-06-03 VITALS — BODY MASS INDEX: 22.82 KG/M2 | WEIGHT: 173 LBS

## 2021-06-03 VITALS — WEIGHT: 178 LBS | BODY MASS INDEX: 23.48 KG/M2

## 2021-06-03 VITALS — WEIGHT: 172 LBS | BODY MASS INDEX: 22.69 KG/M2

## 2021-06-03 VITALS — WEIGHT: 175 LBS | BODY MASS INDEX: 23.09 KG/M2

## 2021-06-03 VITALS — WEIGHT: 173 LBS | BODY MASS INDEX: 22.82 KG/M2

## 2021-06-03 VITALS — WEIGHT: 171 LBS | BODY MASS INDEX: 22.56 KG/M2

## 2021-06-03 VITALS — BODY MASS INDEX: 22.96 KG/M2 | WEIGHT: 174 LBS

## 2021-06-03 VITALS — HEIGHT: 73 IN | BODY MASS INDEX: 22.8 KG/M2 | WEIGHT: 172 LBS

## 2021-06-03 VITALS — WEIGHT: 179 LBS | BODY MASS INDEX: 23.62 KG/M2

## 2021-06-03 VITALS — WEIGHT: 176 LBS | BODY MASS INDEX: 23.22 KG/M2

## 2021-06-03 VITALS — BODY MASS INDEX: 23.04 KG/M2 | WEIGHT: 174.6 LBS

## 2021-06-03 VITALS — WEIGHT: 174 LBS | BODY MASS INDEX: 22.96 KG/M2

## 2021-06-03 VITALS — BODY MASS INDEX: 23.48 KG/M2 | WEIGHT: 178 LBS

## 2021-06-16 PROBLEM — I50.22 CHRONIC SYSTOLIC HEART FAILURE (H): Chronic | Status: ACTIVE | Noted: 2019-02-21

## 2021-06-16 PROBLEM — I25.10 CORONARY ARTERY DISEASE DUE TO LIPID RICH PLAQUE: Chronic | Status: ACTIVE | Noted: 2019-02-21

## 2021-06-16 PROBLEM — I25.83 CORONARY ARTERY DISEASE DUE TO LIPID RICH PLAQUE: Chronic | Status: ACTIVE | Noted: 2019-02-21

## 2021-06-16 PROBLEM — I21.4 ACUTE NON-ST ELEVATION MYOCARDIAL INFARCTION (NSTEMI) (H): Status: ACTIVE | Noted: 2019-04-18

## 2021-06-16 PROBLEM — I25.5 ISCHEMIC CARDIOMYOPATHY: Status: ACTIVE | Noted: 2019-03-26

## 2021-06-16 PROBLEM — I47.29 NONSUSTAINED VENTRICULAR TACHYCARDIA (H): Status: ACTIVE | Noted: 2019-04-17

## 2021-06-17 NOTE — PATIENT INSTRUCTIONS - HE
Patient Instructions by Joseph James NP at 4/29/2019  8:30 AM     Author: Joseph James NP Service: -- Author Type: Nurse Practitioner    Filed: 4/29/2019  9:15 AM Encounter Date: 4/29/2019 Status: Addendum    : Joseph James NP (Nurse Practitioner)    Related Notes: Original Note by Joseph James NP (Nurse Practitioner) filed at 4/29/2019  8:45 AM       Abdias Glover,    It was a pleasure to see you today at the [facility].     My recommendations after this visit include:    - No medications changes made today     - Stay on low sodium diet < 2000 mg/day, monitor daily weight (bring weight log book for each visit) and stay physically active as tolerated    - Please seek medical attention if recurrent chest pain/tightness/pressure/ or shortness of breath    -Please call if you experience rapid weight gain 2-3 lbs two days in a row or 5 lbs in a week with worsening shortness of breath, lightheaded, dizziness, abdominal bloating, and leg swelling     - Follow up with primary cardiologist as scheduled    - Please call Felicity Diamond RN on [phone], if you have any questions or concerns    Joseph James CNP      Medication     o Take all your medications as prescribed  o Do not stop any medications without talking with a healthcare provider    Exercise      o Physical activity is important for overall health  o Set a goal of 150 minutes of exercise each week  o For example, 30 minutes of exercise 5 days each week.    o These 30 minutes can be broken into shorter periods of 15 minutes twice daily or 10 minutes three times daily  o Start any exercise program slowly and work towards the goal of 150 minutes each week  o For example, you may start with 10 minutes and plan to add a few minutes each week as you get stronger   o Examples of exercise include walking, swimming, or biking  o Remember to stretch and stay hydrated with exercise    Diet     o A heart healthy diet includes:  o A  variety of fruits and vegetables  o Whole grains  o Low-fat dairy (fat-free, 1% fat, and low-fat)  o Lean meats and poultry without skin   o Fish (eat fish 2 times each week)  o Nuts  o Limit saturated fat to about 13 grams each day (based on a 2000 calorie diet)  o Limit red meat  o Limit sugars (sweets and sugary beverages)  o Limit your portion sizes  o Do not add salt to your food when cooking or at the table  o Limit alcohol intake (no more than 1 drink each day for women or 2 drinks each day for men)    Weight Loss     o Work on losing weight with diet and exercise  o You BMI (body mass index) should be between 18.5-24.9  o This is a calculation of your weight and height  o Please ask your healthcare provider for your BMI    Manage Other Chronic Health Conditions     o Control cholesterol  o Eat a diet low in saturated fat  o Exercise   o Take a statin medication as prescribed  o Manage blood pressure  o Eat a diet low in sodium  o Exercise  o Reduce stress  o Lose weight   o Take blood pressure medications as prescribed  o Control blood sugars if diabetic  o Monitor sugars and carbohydrates in your diet  o Lose weight   o Take diabetes medications as prescribed  o Follow-up with your primary care provider to make sure your blood sugars are well controlled    Stress Reduction     o Find time each day to relax  o Reading, listening to music, yoga, meditation, exercise, spending time with friends and family, volunteering   o Get 6-8 hours of sleep each night    Smoking Cessation     o Smoking causes numerous health problems including coronary artery disease  o It is never too late to quit  o Set realistic goals for quitting  o Decrease the number of cigarettes used each week  o Use nicotine gum or patches to help you quit    Information from the American Heart Association.  Please visit their website at www.heart.org    Patient Education   Eating Heart-Healthy Foods  Eating has a big impact on your heart health.  In fact, eating healthier can improve several of your heart risks at once. For instance, it helps you manage weight, cholesterol, and blood pressure. Here are ideas to help you make heart-healthy changes without giving up all the foods and flavors you love.  Getting started    Talk with your healthcare provider about eating plans, such as the DASH or Mediterranean diet. You may also be referred to a dietitian.    Change a few things at a time. Give yourself time to get used to a few eating changes before adding more.    Work to create a tasty, healthy eating plan that you can stick to for the rest of your life.    Goals for healthy eating  Below are some tips to improve your eating habits:    Limit saturated fats and trans fats. Saturated fats raise your levels of cholesterol, so keep these fats to a minimum. They are found in foods such as fatty meats, whole milk, cheese, and palm and coconut oils. Avoid trans fats because they lower good cholesterol as well as raise bad cholesterol. Trans fats are most often found in processed foods.    Reduce sodium (salt) intake. Eating too much salt may increase your blood pressure. Limit your sodium intake to 2,300 milligrams (mg) per day (the amount in 1 teaspoon of salt), or less if your healthcare provider recommends it. Dining out less often and eating fewer processed foods are two great ways to decrease the amount of salt you consume.    Managing calories. A calorie is a unit of energy. Your body burns calories for fuel, but if you eat more calories than your body burns, the extras are stored as fat. Your healthcare provider can help you create a diet plan to manage your calories. This will likely include eating healthier foods as well as exercising regularly. To help you track your progress, keep a diary to record what you eat and how often you exercise.  Choose the right foods  Aim to make these foods staples of your diet. If you have diabetes, you may have different  recommendations than what is listed here:    Fruits and vegetables provide plenty of nutrients without a lot of calories. At meals, fill half your plate with these foods. Split the other half of your plate between whole grains and lean protein.    Whole grains are high in fiber and rich in vitamins and nutrients. Good choices include whole-wheat bread, pasta, and brown rice.    Lean proteins give you nutrition with less fat. Good choices include fish, skinless chicken, and beans.    Low-fat or nonfat dairy provides nutrients without a lot of fat. Try low-fat or nonfat milk, cheese, or yogurt.    Healthy fats can be good for you in small amounts. These are unsaturated fats, such as olive oil, nuts, and fish. Try to have at least 2 servings per week of fatty fish, such as salmon, sardines, mackerel, rainbow trout, and albacore tuna. These contain omega-3 fatty acids, which are good for your heart. Flaxseed is another source of a heart-healthy fat.  More on heart-healthy eating  Read food labels  Healthy eating starts at the grocery store. Be sure to pay attention to food labels on packaged foods. Look for products that are high in fiber and protein, and low in saturated fat, cholesterol, and sodium. Avoid products that contain trans fat. And pay close attention to serving size. For instance, if you plan to eat two servings, double all the numbers on the label.  Prepare food right  A key part of healthy cooking is cutting down on added fat and salt. Look on the internet for lower-fat, lower-sodium recipes. Also, try these tips:    Remove fat from meat and skin from poultry before cooking.    Skim fat from the surface of soups and sauces.    Broil, boil, bake, steam, grill, and microwave food without added fats.    Choose ingredients that spice up your food without adding calories, fat, or sodium. Try these items: horseradish, hot sauce, lemon, mustard, nonfat salad dressings, and vinegar. For salt-free herbs and  spices, try basil, cilantro, cinnamon, pepper, and rosemary.  Date Last Reviewed: 10/1/2017    1841-2551 [employer]. [address] [place] [address], [place] [address]. All rights reserved. This information is not intended as a substitute for professional medical care. Always follow your healthcare professional's instructions.       What Is Heart Failure?  The heart is a muscle. It pumps oxygen-rich blood to all parts of the body. When you have heart failure, the heart cant pump as well as it should. Blood and fluid may back up into the lungs, and some parts of the body dont get enough oxygen-rich blood to work normally. These problems lead to the symptoms you feel.    When You Have Heart Failure  Because of heart failure, not enough blood leaves the heart with each beat. There are two types of heart failure. Both affect the ventricles ability to pump blood. You may have one or both types.     Systolic heart failure: The heart muscle becomes weak and enlarged. It cant pump enough blood forward when the ventricles contract. Ejection fraction is lower than normal.   Diastolic heart failure: The heart muscle becomes stiff. It doesnt relax normally between contractions, which keeps the ventricles from filling with blood. Ejection fraction is often in the normal range.     How Heart Failure Affects Your Body  When the heart doesnt pump enough blood, hormones (body chemicals) are sent to increase the amount of work the heart does. Some hormones make the heart grow larger. Others tell the heart to pump faster. As a result, the heart may pump more blood at first, but it cant keep up with the ongoing demands. So, the heart muscle becomes more damaged. Over time, even less blood is pumped through the heart. This leads to problems throughout the body.    What Is Ejection Fraction?  Ejection fraction (EF) measures how much blood the heart pumps out (ejects). This is measured to help diagnose heart failure. A healthy heart  pumps at least half of the blood from the ventricles with each beat. This means a normal ejection fraction is around 50% or more.       0170-4148 [employer]. [address] [place] [address], [place] [address]. All rights reserved. This information is not intended as a substitute for professional medical care. Always follow your healthcare professional's instructions.

## 2021-06-24 NOTE — PROGRESS NOTES
ITP ASSESSMENT   Assessment Day: Initial    Session Number: 1&2  Precautions: sternal precautions    Diagnosis: CAB    Risk Stratification: High    Referring Provider: Gianluca Trevino MD  EXERCISE  Exercise Assessment: Initial       6 Minute Walk Test   Pre   Pre Exercise HR: 64                    Pre Exercise BP: 103/60      Peak  Peak HR: 68                   Peak BP: 164/79    Peak feet: 600    Peak O2 SAT: 96    Peak RPE: 12    Peak MPH: 1.14      Symptoms:  Peak Symptoms: right hip pain      5 mins. Post  5 Min Post HR: 58    5 Min Post BP: 149/68                           Exercise Plan  Goals Next 30 days  ADL'S: Pt. will return to driving.    Leisure: 2-3 flights of stairs to basement with out fatigue, following surgical guidelines.      Education Goals: All goals in this section met    Education Goals Met: Patient can state cardiac s/s and appropriate emergency response.;Has system for taking medication.;Medication review.      Exercise Prescription  Exercise Mode: Treadmill;Bike;Nustep;Hallway Walking    Frequency: 2-3x/week    Duration: 40 min    Intensity / THR: 20-30 beats above resting heart rate    RPE 11-14  Progression / Met level: 3-4    Resistive Training?: No      Current Exercise (mins/week): 60      Interventions  Home Exercise:  Mode: walking    Frequency: daily    Duration: 5-20 min. 2-5X/day depending on hip pain      Education Material : Educational videos;Provide written material;Individual education and counseling;Offer educational classes      Education Completed  Exercise Education Completed: Signs and Symptoms;Medication review;RPE;Warm up/cool down;BP/HR Reponse to exercise;Benefits of Exercise;End point of exercise;Emergency Plan              Exercise Follow-up/Discharge  Follow up/Discharge: Pt. will continue to walk multiple trips in his home.  He will go to mall and walk when he can drive.   NUTRITION  Nutrition Assessment: Initial      Nutrition Risk Factors:  Nutrition Risk  "Factors: Diabetes;Dyslipidemia  HbA1c: 7.1  Monitors blood sugar at home: No  Cholesterol: NA  LDL: 79  HDL: NA  Triglycerides: NA      Nutrition Plan  Interventions  Other Nutrition Intervention: Diet Class;Therapist/Pt Discussion;Educational Videos;Provide with Written Material;Referral to DM education      Education Completed  Nutrition Education Completed: Risk factor overview      Goals  Nutrition Goals (Next 30 days): Review Dietitian schedule;Patient will follow a low saturated fat diet;Patient will follow a low sodium diet      Goals Met  Nutrition Goals Met: Patient can identify their risk factors for CAD;Completed Nutritional Risk Screen;Provided Rate your Plate Survey      Height, Weight, and  BMI  Weight: 171 lb (77.6 kg)  Height: 6' 1\" (1.854 m)  BMI: 22.57      Nutrition Follow-up  Follow-up/Discharge: Pt. is interested in meeting with a dietitian.         Other Risk Factors  Other Risk Factor Assessment: Initial      HTN Risk Factor: Hypertension      Hypertension Plan  Goals  HTN Goals: Follow low sodium diet;Take medication as prescribed;Exercises regularly      HTN Interventions  HTN Interventions: Diet consult;Provide written material;Therapist/patient discussion;Offer educational videos;Offer educational classes        Tobacco Risk Factor: Tobacco      Initial Use:: 1 ppd  Current Use:: 0      Tobacco Plan  Tobacco Goals  Tobacco Goals: Patient remain tobacco free      Goals Met  Tobacco Goals Met: Patient remain tobacco free      Tobacco Interventions  Tobacco Interventions: Therapist/patient discussion;Provide written material;Offer educational videos;Offer class on risk factor modification      Tobacco Education Completed  Tobacco Education Completed: Identifies triggers;Risk factor overview      Risk Factor Follow-up   Follow-up/Discharge: Pt. is committed to not smoking.       PSYCHOSOCIAL  Psychosocial Assessment: Initial       [facility] Q of L Summary Score: 22      EZEQUIEL-D Score: " 5      Psychosocial Risk Factor: NA      Psychosocial Plan  Interventions  If EZEQUIEL-D > 15 send letter to MD  Interventions: Offer educational videos and classes;Provide written material;Individual education and counseling      Education Completed  No Data Recorded    Goals  Goals (Next 30 days): Improvement in [employer] COOP score      Goals Met  Goals Met: Identified Support system;Identify stressors;Practicing stress management skills      Psychosocial Follow-up  Follow-up/Discharge: Pt. states he is going well with adjusting to having surgery.  He states the hardest part is feeling bored.             No Data Recorded    Signature: _____________________________________________________________    Date: __________________    Time: __________________

## 2021-06-24 NOTE — PROGRESS NOTES
Cardiac Rehab  Phase II Assessment    Assessment Date: 3/12/19    Diagnosis/Procedure: CAD/CABG x 3 LIMA -LAD, SVG-JEREMY/RAMUS, SVG-PDA  Date of Onset: 2/22/19  ICD/Pacemaker: No   Post-op Complications: none  ECG History: SR EF%:35-40  Past Medical History: COPD, DM type 2, PVD-s/p FEM-POP BYPASS 2001, SMOKER, positive family hx      Physical Assessment  Precautions/ Physical Limitations: sternal precautions, right hip pain, L/E claudication sx  Oxygen: No  O2 Sats: 96 Lung Sounds: clear Edema: slight in right leg  Incisions: good  Sleeping Pattern: fair   Appetite: fair   Nutrition Risk Screen: WNL    Pain  Location: right chest  Characteristics:soreness  Intensity: (0-10 scale) 1  Current Pain Management: medication and rest  Intervention: Tylenol PRN  Response: good relief    Psychosocial/ Emotional Health  1. In the past 12 months, have you been in a relationship where you have been abused physically, emotionally, sexually or financially? No  notified: NA  2. Who do you turn to for emotional support?: daughters  3. Do you have cultural or spiritual needs? No  4. Have there been any major life changes in the past 12 months? No    Referral Information  Primary Physician: Dr. Archer  Cardiologist: Dr. Mroa  Surgeon: Dr. Trevino    Home exercise/Equipment: wt. machine    Patient's long-term goal(s): to be able to do yard work this summer    1. Living Accommodations: Home Steps: Yes      Support people at home: no   2. Marital Status:   3. Family is able to assist with cares      Taoism/Community involvement: yes  4. Recreation/Hobbies: remote control drones and helicopters, home repairs/improvements

## 2021-06-25 NOTE — PROGRESS NOTES
Summary of Event: Pt arrived to Cardiac Rehab complaining of having Lightheadedness off and on since Thursday. Attempted to do a Hallwalk, within 4 minutes of walking we stopped twice due to balance issues. BP 88/54, Internal Code 9 Called    Information called/faxed to MD/NP  Clinician Name:    Dispensation of Patient:  Sent to Emergency Room with Code 9 Team      Parameter On Arrival With Event At Departure   Time 1125  1200   Heart Rate 63 78 65   Rhythm NSR, freq PVC Trigeminy SR, freq PVC   Blood Pressure 96/60 IPE - 88/54 116/65   Oxygen Sats.  95 97   Blood Sugar mg/dl NA - Metformin Only     Other:  Lightheadedness      Follow-up of Outcome: Went to ED with Code 9 Team

## 2021-06-27 ENCOUNTER — HEALTH MAINTENANCE LETTER (OUTPATIENT)
Age: 71
End: 2021-06-27

## 2021-06-27 NOTE — PROGRESS NOTES
Progress Notes by Joseph James NP at 4/29/2019  8:30 AM     Author: Joseph James NP Service: -- Author Type: Nurse Practitioner    Filed: 4/29/2019  9:51 AM Encounter Date: 4/29/2019 Status: Signed    : Joseph James NP (Nurse Practitioner)                 Click to link to [facility]       [facility] HEART CARE NOTE      Assessment/Recommendations   1. Coronary artery disease with s/p NSTEMI and CABG X 3 (2/2019): Recent hospitalization with with non-STEMI and  11 beats of ventricular tachycardia in cardiac rehab.Coronary angiogram on 4/18/2019 showed 90% stenosis in the distal left main, 85% stenosis in ostial LAD, 85% stenosis in ramus, and 99% stenosis in ostial circumflex to mid circumflex, and 100% stenosis in RCA.  He had successful PCI/MONROE to distal left main, ostial LAD, and ostial first marginal to first marginal lesion. Also noted to have LIMA to mid LAD occluded in the mid graft.  Dr. Griffin has suggested stress imaging to verify the proximal LAD for adequate perfusion given closure of LIMA but flow from the both SVG to ramus and distal LAD (see Angio report for detail). Dual antiplatelet therapy is being used with aspirin indefinitely and clopidogrel per cardiologist.We discussed the importance of antiplatelet therapy and talking with his cardiologist prior to stopping these medications for any reason.    Patient denies further chest pain, angina or numbness in bilateral arms.Risk factor modification and lifestyle management topics were discussed including managing comorbidities, heart healthy diet, exercise and stress reduction.  Cardiac rehab has been initiated and tolerating well.  Reviewed cardiac rehab note-no further V. tach noted.  Denies bleeding complications.  Discussed and reviewed about how to utilize nitro appropriately.    2.  Dyslipidemia: Abdias HERRERA Selamslim is on high intensity statin therapy with atorvastatin 80 mg daily. Most recent LDL is 78.  Most recent  AST/ALT in 2013 is within normal limits.  He was started on co-Q10 in the hospital.  He has been tolerating the medication without side effect. We discussed a diet low in saturated fat, weight loss, and exercise along with medication for better control of cholesterol.     3. Ischemic cardiomyopathy with systolic dysfunction, NYHA class II with EF of 35-40%: Well compensated with no signs and symptoms of heart failure except mild shortness of breath on exertion at baseline with history of chronic smoking.    We discussed and reviewed about heart failure, medication management, and lifestyle management including low sodium diet <2 g/day, daily weight, and staying physically active as tolerated. Patient declined to with the nurse clinician for heart failure education.  Provided heart failure education materials and resources.  He is planning to follow-up at [facility] with his cardiology team.    Heart failure medications:  - Beta blocker therapy with metoprolol tartrate 12.5 mg two times a day (would consider switching to metoprolol succinate in the near future)  - ACEI therapy with lisinopril was discontinued in the hospital due to hypotension-consider restart if blood pressure stable    4.  Hypertension: His blood pressure is 140/68-reports he has not taken his a.m medications yet. Currently on Metroprolol tartrate 12.5 mg twice a day    5.  Diabetes: Most recent A1C is 6.6.  We discussed the importance of tightly controlled blood sugars to minimize risk of worsening coronary artery disease. Defer to PCP for diabetes managment.    Patient requested for angiogram report-recommended to sign up for my chart and also send note to medical records.    Follow up with primary cardiologist at [facility] as scheduled tomorrow and [facility] team for HF management.     History of Present Illness    Abdias Glover is 68 y.o. with a past medical history of coronary artery disease with status post  non-STEMI and CABG x3 in February 2019, dyslipidemia, ischemic cardiomyopathy with systolic dysfunction, diabetes type 2, nonsustained ventricular tachycardia, peripheral arterial disease, and recent non-STEMI who is seen at [facility] for post coronary intervention follow up.  Patient was hospitalized between April 18 through April 20, 2019 with non-STEMI.  Patient was experiencing chest discomfort at home.  At the cardiac rehab he was noted to have 11 beats of ventricular tachycardia.  He was also experiencing chest discomfort similar to prior angina episode with some numbness in bilateral arms.  His troponins were elevated at 0.85.  He was transferred to [facility] for coronary angiogram from [facility].  He underwent coronary angiogram on 4/18/2019 showed 90% stenosis in the distal left main, 85% stenosis in ostial LAD, 85% stenosis in ramus, and 99% stenosis in ostial circumflex to mid circumflex, and 100% stenosis in RCA.  He had successful PCI/MONROE to distal left main, ostial LAD, and ostial first marginal to first marginal lesion.  Also noted to have LIMA to mid LAD occluded in the mid graft.  Dr. Griffin has suggested stress imaging to verify the proximal LAD for adequate perfusion given closure of LIMA but flow from the both SVG to ramus and distal LAD (see Angio report for detail). Dual antiplatelet therapy is being used with aspirin indefinitely and clopidogrel per cardiologist.  Most recent echocardiogram in February 2019 showed reduced left ventricular systolic function with EF of 35 to 40% with distal anterior hypokinesis.  Patient was started on co-Q10, magnesium supplement, and Metroprolol tartrate.  His lisinopril was stopped due to hypotension.  I believe his statin was switched from simvastatin to atorvastatin on a higher dose.    Today, patient presented to the heart care clinic accompanied by his daughter.  He denies further chest pain or bilateral arm numbness since recent  stent placement.  He always has mild shortness of breath on exertion at baseline from history of smoking.  He denies lightheadedness, shortness of breath, orthopnea, PND, palpitations, chest pain, abdominal fullness/bloating and lower extremity edema.  He is in cardiac rehab and has not had further V. tach episodes.  He is trying to follow heart healthy and low-sodium diet.  He has an appointment with his primary cardiologist at [facility] tomorrow.  He saw his PCP recently.  He has participated in nutrition class through cardiac rehab.    Coronary Angiogram- reviewed:  Results for orders placed during the hospital encounter of 04/18/19   Cardiac Catheterization [CATH01] 04/18/2019    Addendum    Dist LM lesion is 90% stenosed.   Radial access was not utilized for   Ost 1st Mrg to 1st Mrg lesion is 99% stenosed.   Radial access was not utilized for .   Prox RCA to Dist RCA lesion is 100% stenosed.   Ost LAD lesion is 85% stenosed.   Ost Ramus lesion is 85% stenosed.   A stent was successfully placed.   A drug eluting stent was successfully placed.   Radial access was not utilized for   Radial access was not utilized for    A stent was successfully placed.   Ost Cx to Mid Cx lesion is 99% stenosed.   A stent was successfully placed.   Mid LAD lesion is 50% stenosed.   Pt s/p recent CABG presents with nonQ wave MI  Angiography via left radial  LM severe narrowing 90% calcified  LAD osital/prox 85-90% and distal 50%; LIMA to mid LAD occluded in mid  graft; SVG sequence from Ramus patent to distal LAD Ramus prox/ostial 85-90% patent SVG which is sequence to distal LAD Circ prox 90% calcified severe into mid and involving OM1 RCA prox 100% patent SVG to PDA  LVEDP 7mmHg given iv fluids  PCI:  1. LM/Circ - complex calcified lesion - rotablator 1.25mm flavia due to  small diameter of mid Circ; followed by wire both OM/Circ and PTCA and  deployed [id number] MONROE covering mid Circ into LM; post dilated stent,  noted 10%  residual recoil of stent in prox Circ post high pressure NC  balloon (appeared fully opened) 2. OM1 - PTCA - noted dissection of OM,  use of guideliner unable to place  2.0 mm darci stent, able to deliver synergy MONROE and deployed 8atm, post  dilated- noted dissection still present distal to stent, PTCA with  dissection plane now pressed against the vessel wall and not obstructive.  Imp/plan 1. S/p CABG with known ischemic CM adm with non Q wave MI - s/p PCI of  calcified LM/circ with MONROE into OM - asp/plavix, metoprolol, statin,  cardiac rehab, follow up with [facility].  Suggest stress imaging  to verify the prox LAD is adequately perfused given closure of LIMA but  flow from both SVG to Ramus and distal LAD.         Rodrick Griffin MD 4/19/2019  7:33 AM          Narrative   Dist LM lesion is 90% stenosed.    Radial access was not utilized for     Ost 1st Mrg to 1st Mrg lesion is 99% stenosed.    Radial access was not utilized for     Prox RCA to Dist RCA lesion is 100% stenosed.    Ost LAD lesion is 85% stenosed.    Ost Ramus lesion is 85% stenosed.    A stent was successfully placed.    A drug eluting stent was successfully placed.    Radial access was not utilized for     Radial access was not utilized for     A stent was successfully placed.    Ost Cx to Mid Cx lesion is 99% stenosed.    A stent was successfully placed.    Mid LAD lesion is 50% stenosed.     Pt s/p recent CABG presents with nonQ wave MI    Angiography via left radial   LM severe narrowing 90% calcified   LAD osital/prox 85-90% and distal 50%; LIMA to mid LAD occluded in mid   graft; SVG sequence from Ramus patent to distal LAD  Ramus prox/ostial 85-90% patent SVG which is sequence to distal LAD  Circ prox 90% calcified severe into mid and involving OM1  RCA prox 100% patent SVG to PDA    LVEDP 7mmHg given iv fluids    PCI:   1. LM/Circ - complex calcified lesion - rotablator 1.25mm flavia due to   small diameter of mid Circ;  followed by wire both OM/Circ and PTCA and   deployed [id number] MONROE covering mid Circ into LM; post dilated stent,   noted 10% residual recoil of stent in prox Circ post high pressure NC   balloon (appeared fully opened)  2. OM1 - PTCA - noted dissection of OM,  use of guideliner unable to place   2.0 mm darci stent, able to deliver synergy MONROE and deployed 8atm, post   dilated- noted dissection still present distal to stent, PTCA with   dissection plane now pressed against the vessel wall and not obstructive.    Imp/plan  1. S/p CABG with known ischemic CM adm with non Q wave MI - s/p PCI of   calcified LM/circ with MONROE into OM - asp/plavix, metoprolol, statin,   cardiac rehab, follow up with [facility]       ECHO-Reviewed ([facility])   CONCLUSION:    Echo 2019-02-21 15:14.     Normal LV size.     Moderately reduced LV systolic function, EF 35-40%. Distal anterior     septal hypokinesis, infeior akinesis    Mild LA dilatation.     Mildly dilated IVC.     Left Ventricular Ejection Fraction: 35-40 %      Physical Examination Review of Systems   Vitals:    04/29/19 0830   BP: 140/68   Pulse: 62   Resp: 16     Body mass index is 22.69 kg/m .  Wt Readings from Last 3 Encounters:   04/29/19 172 lb (78 kg)   04/24/19 173 lb (78.5 kg)   04/20/19 162 lb 14.4 oz (73.9 kg)       General Appearance:     Alert, cooperative and in no acute distress.   ENT/Mouth: membranes moist, no oral lesions or bleeding gums.      EYES:  no scleral icterus, normal conjunctivae   Neck: no carotid bruits or thyromegaly   Chest/Lungs:   lungs sounds diminished globally with faint crackles in bilateral bases but no rales or wheezing, respirations unlabored   Cardiovascular:   Heart rate regular. Normal first and second heart sounds with no murmurs, rubs, or gallops; the carotid, radial and posterior tibial pulses are intact, Jugular venous pressure flat with no HJR, no edema bilateral lower extremities    Abdomen:  Soft, nontender,  nondistended, bowel sounds present   Extremities: no cyanosis or clubbing   Skin:  Neurologic: warm, dry.  mood and affect are appropriate, alert and oriented x3     Puncture Site: Left radial site is soft with no bruising.  Radial pulses and Pedal pulses intact and symmetrical.  CMS intact.      General: WNL  Eyes: WNL  Ears/Nose/Throat: WNL  Lungs: Shortness of Breath  Heart: WNL  Stomach: WNL  Bladder: WNL  Muscle/Joints: WNL  Skin: WNL  Nervous System: WNL  Mental Health: WNL     Blood: WNL     Medical History  Surgical History Family History Social History   Past Medical History:   Diagnosis Date   ? COPD (chronic obstructive pulmonary disease) (H)    ? Coronary artery disease due to lipid rich plaque 2/21/2019    Coronary angiogram 2/21/2019 Conclusions: 1. Severe 99% distal Left Main stenosis involving ostial LAD and ostial LCx. 2. RCA has 100% proximal occlusion. Distal RPDA/RPLA fill via left to right and right to right collaterals. 3. LVEDP 10mmHg    ? DM II (diabetes mellitus, type II), controlled (H)    ? Dyslipidemia, goal LDL below 70 4/1/2016   ? Essential hypertension    ? Ischemic cardiomyopathy 03/2019    EF 35-40%   ? Peripheral vascular disease (H)     Past Surgical History:   Procedure Laterality Date   ? CORONARY ARTERY BYPASS GRAFT  02/2019    [facility], Dr. Trevino   ? CV CORONARY ANGIOGRAM N/A 4/18/2019    Procedure: Coronary Angiogram;  Surgeon: Rodrick Griffin MD;  Location: [facility] Cath Lab;  Service: Cardiology   ? CV LEFT HEART CATHETERIZATION WO LEFT VETRICULOGRAM Left 4/18/2019    Procedure: Left Heart Catheterization Without Left Ventriculogram;  Surgeon: Rodrick Griffin MD;  Location: [facility] Cath Lab;  Service: Cardiology    Family History   Problem Relation Age of Onset   ? Acute Myocardial Infarction Father 55   ? Acute Myocardial Infarction Brother 45    Social History     Socioeconomic History   ? Marital status:      Spouse name: Not on file   ?  Number of children: Not on file   ? Years of education: Not on file   ? Highest education level: Not on file   Occupational History     Employer: RETIRED   Social Needs   ? Financial resource strain: Not on file   ? Food insecurity:     Worry: Not on file     Inability: Not on file   ? Transportation needs:     Medical: Not on file     Non-medical: Not on file   Tobacco Use   ? Smoking status: Former Smoker     Types: Cigarettes     Last attempt to quit: 2019     Years since quittin.1   ? Smokeless tobacco: Never Used   Substance and Sexual Activity   ? Alcohol use: Not Currently   ? Drug use: Never   ? Sexual activity: Not on file   Lifestyle   ? Physical activity:     Days per week: Not on file     Minutes per session: Not on file   ? Stress: Not on file   Relationships   ? Social connections:     Talks on phone: Not on file     Gets together: Not on file     Attends Mandaeism service: Not on file     Active member of club or organization: Not on file     Attends meetings of clubs or organizations: Not on file     Relationship status: Not on file   ? Intimate partner violence:     Fear of current or ex partner: Not on file     Emotionally abused: Not on file     Physically abused: Not on file     Forced sexual activity: Not on file   Other Topics Concern   ? Not on file   Social History Narrative   ? Not on file          Medications  Allergies   Current Outpatient Medications   Medication Sig Dispense Refill   ? acetaminophen (TYLENOL) 325 MG tablet Take 325-650 mg by mouth every 4 (four) hours as needed for pain.            ? albuterol (PROAIR HFA;PROVENTIL HFA;VENTOLIN HFA) 90 mcg/actuation inhaler Inhale 2 puffs every 6 (six) hours as needed for wheezing.     ? aspirin 81 mg chewable tablet Chew 81 mg daily with supper.            ? atorvastatin (LIPITOR) 80 MG tablet Take 1 tablet (80 mg total) by mouth at bedtime. 30 tablet 0   ? cetirizine (ZYRTEC) 10 MG tablet Take 10 mg by mouth daily as needed  for allergies.            ? clopidogrel (PLAVIX) 75 mg tablet Take 75 mg by mouth daily with supper.            ? coenzyme Q10 (COQ-10) 100 mg capsule Take 1 capsule (100 mg total) by mouth daily.  0   ? magnesium chloride (SLOW-MAG) 64 mg TbEC delayed-release tablet Take 2 tablets (128 mg total) by mouth daily.  0   ? metFORMIN (GLUCOPHAGE-XR) 500 MG 24 hr tablet Take 1,000 mg by mouth daily with breakfast.            ? metoprolol tartrate (LOPRESSOR) 25 MG tablet Take 0.5 tablets (12.5 mg total) by mouth 2 (two) times a day. 30 tablet 0   ? nicotine (NICODERM CQ) 21 mg/24 hr Place 1 patch on the skin daily. As needed           ? nitroglycerin (NITROSTAT) 0.4 MG SL tablet Place 0.4 mg under the tongue every 5 (five) minutes as needed for chest pain.       No current facility-administered medications for this visit.       Allergies   Allergen Reactions   ? Penicillins Hives         Lab Results    Chemistry CBC BNP   Lab Results   Component Value Date    CREATININE 0.64 (L) 04/19/2019    BUN 9 04/19/2019     04/19/2019    K 4.0 04/19/2019     (H) 04/19/2019    CO2 25 04/19/2019     Creatinine (mg/dL)   Date Value   04/19/2019 0.64 (L)   04/18/2019 0.74   04/17/2019 0.73   03/18/2019 0.75    Lab Results   Component Value Date    WBC 9.2 04/17/2019    HGB 12.0 (L) 04/19/2019    HCT 40.9 04/17/2019    MCV 93 04/17/2019     04/19/2019    Lab Results   Component Value Date     (H) 03/18/2019     BNP (pg/mL)   Date Value   03/18/2019 962 (H)      Joseph James CNP  [facility]        This note has been dictated using voice recognition software. Any grammatical, typographical, or context distortions are unintentional and inherent to the software

## 2021-07-14 PROBLEM — I21.4 NSTEMI (NON-ST ELEVATED MYOCARDIAL INFARCTION) (H): Status: RESOLVED | Noted: 2019-04-18 | Resolved: 2019-04-19

## 2021-10-16 ENCOUNTER — HEALTH MAINTENANCE LETTER (OUTPATIENT)
Age: 71
End: 2021-10-16

## 2022-02-05 ENCOUNTER — HEALTH MAINTENANCE LETTER (OUTPATIENT)
Age: 72
End: 2022-02-05

## 2022-07-23 ENCOUNTER — HEALTH MAINTENANCE LETTER (OUTPATIENT)
Age: 72
End: 2022-07-23

## 2022-10-01 ENCOUNTER — HEALTH MAINTENANCE LETTER (OUTPATIENT)
Age: 72
End: 2022-10-01

## 2023-05-14 ENCOUNTER — HEALTH MAINTENANCE LETTER (OUTPATIENT)
Age: 73
End: 2023-05-14

## 2023-08-06 ENCOUNTER — HEALTH MAINTENANCE LETTER (OUTPATIENT)
Age: 73
End: 2023-08-06

## 2023-12-04 ENCOUNTER — TRANSCRIBE ORDERS (OUTPATIENT)
Dept: OTHER | Age: 73
End: 2023-12-04

## 2023-12-04 DIAGNOSIS — I25.10 ATHEROSCLEROSIS OF NATIVE CORONARY ARTERY OF NATIVE HEART, UNSPECIFIED WHETHER ANGINA PRESENT: Primary | ICD-10-CM

## 2023-12-07 ENCOUNTER — TRANSCRIBE ORDERS (OUTPATIENT)
Dept: OTHER | Age: 73
End: 2023-12-07

## 2023-12-24 ENCOUNTER — HEALTH MAINTENANCE LETTER (OUTPATIENT)
Age: 73
End: 2023-12-24

## 2024-01-04 ENCOUNTER — HOSPITAL ENCOUNTER (OUTPATIENT)
Dept: CARDIAC REHAB | Facility: HOSPITAL | Age: 74
Discharge: HOME | End: 2024-01-04
Attending: INTERNAL MEDICINE
Payer: COMMERCIAL

## 2024-01-04 PROCEDURE — 93798 PHYS/QHP OP CAR RHAB W/ECG: CPT

## 2024-01-04 PROCEDURE — 93797 PHYS/QHP OP CAR RHAB WO ECG: CPT | Mod: 59

## 2024-01-10 ENCOUNTER — HOSPITAL ENCOUNTER (OUTPATIENT)
Dept: CARDIAC REHAB | Facility: HOSPITAL | Age: 74
Discharge: HOME | End: 2024-01-10
Attending: INTERNAL MEDICINE
Payer: COMMERCIAL

## 2024-01-10 PROCEDURE — 93797 PHYS/QHP OP CAR RHAB WO ECG: CPT

## 2024-01-12 ENCOUNTER — MEDICAL CORRESPONDENCE (OUTPATIENT)
Dept: CARDIAC REHAB | Facility: HOSPITAL | Age: 74
End: 2024-01-12
Payer: COMMERCIAL

## 2024-01-15 ENCOUNTER — HOSPITAL ENCOUNTER (OUTPATIENT)
Dept: CARDIAC REHAB | Facility: HOSPITAL | Age: 74
Discharge: HOME | End: 2024-01-15
Attending: INTERNAL MEDICINE
Payer: COMMERCIAL

## 2024-01-15 PROCEDURE — 93798 PHYS/QHP OP CAR RHAB W/ECG: CPT | Performed by: OCCUPATIONAL THERAPIST

## 2024-01-17 ENCOUNTER — HOSPITAL ENCOUNTER (OUTPATIENT)
Dept: CARDIAC REHAB | Facility: HOSPITAL | Age: 74
Discharge: HOME | End: 2024-01-17
Attending: INTERNAL MEDICINE
Payer: COMMERCIAL

## 2024-01-17 PROCEDURE — 93798 PHYS/QHP OP CAR RHAB W/ECG: CPT

## 2024-01-19 ENCOUNTER — HOSPITAL ENCOUNTER (OUTPATIENT)
Dept: CARDIAC REHAB | Facility: HOSPITAL | Age: 74
Discharge: HOME | End: 2024-01-19
Attending: INTERNAL MEDICINE
Payer: COMMERCIAL

## 2024-01-19 PROCEDURE — 93798 PHYS/QHP OP CAR RHAB W/ECG: CPT

## 2024-01-22 ENCOUNTER — HOSPITAL ENCOUNTER (OUTPATIENT)
Dept: CARDIAC REHAB | Facility: HOSPITAL | Age: 74
Discharge: HOME | End: 2024-01-22
Attending: INTERNAL MEDICINE
Payer: COMMERCIAL

## 2024-01-22 PROCEDURE — 93798 PHYS/QHP OP CAR RHAB W/ECG: CPT

## 2024-01-24 ENCOUNTER — HOSPITAL ENCOUNTER (OUTPATIENT)
Dept: CARDIAC REHAB | Facility: HOSPITAL | Age: 74
Discharge: HOME | End: 2024-01-24
Attending: INTERNAL MEDICINE
Payer: COMMERCIAL

## 2024-01-24 PROCEDURE — 93798 PHYS/QHP OP CAR RHAB W/ECG: CPT

## 2024-01-29 ENCOUNTER — HOSPITAL ENCOUNTER (OUTPATIENT)
Dept: CARDIAC REHAB | Facility: HOSPITAL | Age: 74
Discharge: HOME | End: 2024-01-29
Attending: INTERNAL MEDICINE
Payer: COMMERCIAL

## 2024-01-29 PROCEDURE — 93798 PHYS/QHP OP CAR RHAB W/ECG: CPT

## 2024-01-31 ENCOUNTER — HOSPITAL ENCOUNTER (OUTPATIENT)
Dept: CARDIAC REHAB | Facility: HOSPITAL | Age: 74
Discharge: HOME | End: 2024-01-31
Attending: INTERNAL MEDICINE
Payer: COMMERCIAL

## 2024-01-31 PROCEDURE — 93798 PHYS/QHP OP CAR RHAB W/ECG: CPT

## 2024-01-31 PROCEDURE — 93797 PHYS/QHP OP CAR RHAB WO ECG: CPT | Mod: 59

## 2024-02-02 ENCOUNTER — HOSPITAL ENCOUNTER (OUTPATIENT)
Dept: CARDIAC REHAB | Facility: HOSPITAL | Age: 74
Discharge: HOME | End: 2024-02-02
Attending: INTERNAL MEDICINE
Payer: COMMERCIAL

## 2024-02-05 ENCOUNTER — HOSPITAL ENCOUNTER (OUTPATIENT)
Dept: CARDIAC REHAB | Facility: HOSPITAL | Age: 74
Discharge: HOME | End: 2024-02-05
Attending: INTERNAL MEDICINE
Payer: COMMERCIAL

## 2024-02-05 PROCEDURE — 93798 PHYS/QHP OP CAR RHAB W/ECG: CPT

## 2024-02-07 ENCOUNTER — HOSPITAL ENCOUNTER (OUTPATIENT)
Dept: CARDIAC REHAB | Facility: HOSPITAL | Age: 74
Discharge: HOME | End: 2024-02-07
Attending: INTERNAL MEDICINE
Payer: COMMERCIAL

## 2024-02-07 PROCEDURE — 93798 PHYS/QHP OP CAR RHAB W/ECG: CPT

## 2024-02-09 ENCOUNTER — HOSPITAL ENCOUNTER (OUTPATIENT)
Dept: CARDIAC REHAB | Facility: HOSPITAL | Age: 74
Discharge: HOME | End: 2024-02-09
Attending: INTERNAL MEDICINE
Payer: COMMERCIAL

## 2024-02-09 PROCEDURE — 93798 PHYS/QHP OP CAR RHAB W/ECG: CPT

## 2024-02-12 ENCOUNTER — HOSPITAL ENCOUNTER (OUTPATIENT)
Dept: CARDIAC REHAB | Facility: HOSPITAL | Age: 74
Discharge: HOME | End: 2024-02-12
Attending: INTERNAL MEDICINE
Payer: COMMERCIAL

## 2024-02-12 PROCEDURE — 93798 PHYS/QHP OP CAR RHAB W/ECG: CPT

## 2024-02-16 ENCOUNTER — HOSPITAL ENCOUNTER (OUTPATIENT)
Dept: CARDIAC REHAB | Facility: HOSPITAL | Age: 74
Discharge: HOME | End: 2024-02-16
Attending: INTERNAL MEDICINE
Payer: COMMERCIAL

## 2024-02-16 PROCEDURE — 93798 PHYS/QHP OP CAR RHAB W/ECG: CPT

## 2024-02-19 ENCOUNTER — HOSPITAL ENCOUNTER (OUTPATIENT)
Dept: CARDIAC REHAB | Facility: HOSPITAL | Age: 74
Discharge: HOME | End: 2024-02-19
Attending: INTERNAL MEDICINE
Payer: COMMERCIAL

## 2024-02-19 PROCEDURE — 93798 PHYS/QHP OP CAR RHAB W/ECG: CPT

## 2024-02-21 ENCOUNTER — HOSPITAL ENCOUNTER (OUTPATIENT)
Dept: CARDIAC REHAB | Facility: HOSPITAL | Age: 74
Discharge: HOME | End: 2024-02-21
Attending: INTERNAL MEDICINE
Payer: COMMERCIAL

## 2024-02-21 PROCEDURE — 93798 PHYS/QHP OP CAR RHAB W/ECG: CPT

## 2024-02-23 ENCOUNTER — HOSPITAL ENCOUNTER (OUTPATIENT)
Dept: CARDIAC REHAB | Facility: HOSPITAL | Age: 74
Discharge: HOME | End: 2024-02-23
Attending: INTERNAL MEDICINE
Payer: COMMERCIAL

## 2024-02-23 PROCEDURE — 93798 PHYS/QHP OP CAR RHAB W/ECG: CPT

## 2024-02-26 ENCOUNTER — HOSPITAL ENCOUNTER (OUTPATIENT)
Dept: CARDIAC REHAB | Facility: HOSPITAL | Age: 74
Discharge: HOME | End: 2024-02-26
Attending: INTERNAL MEDICINE
Payer: COMMERCIAL

## 2024-02-26 PROCEDURE — 93798 PHYS/QHP OP CAR RHAB W/ECG: CPT

## 2024-02-28 ENCOUNTER — HOSPITAL ENCOUNTER (OUTPATIENT)
Dept: CARDIAC REHAB | Facility: HOSPITAL | Age: 74
Discharge: HOME | End: 2024-02-28
Attending: INTERNAL MEDICINE
Payer: COMMERCIAL

## 2024-02-28 PROCEDURE — 93798 PHYS/QHP OP CAR RHAB W/ECG: CPT

## 2024-03-01 ENCOUNTER — HOSPITAL ENCOUNTER (OUTPATIENT)
Dept: CARDIAC REHAB | Facility: HOSPITAL | Age: 74
Discharge: HOME | End: 2024-03-01
Attending: INTERNAL MEDICINE
Payer: COMMERCIAL

## 2024-03-01 PROCEDURE — 93798 PHYS/QHP OP CAR RHAB W/ECG: CPT

## 2024-03-04 ENCOUNTER — HOSPITAL ENCOUNTER (OUTPATIENT)
Dept: CARDIAC REHAB | Facility: HOSPITAL | Age: 74
Discharge: HOME | End: 2024-03-04
Attending: INTERNAL MEDICINE
Payer: COMMERCIAL

## 2024-03-04 PROCEDURE — 93798 PHYS/QHP OP CAR RHAB W/ECG: CPT

## 2024-03-06 ENCOUNTER — HOSPITAL ENCOUNTER (OUTPATIENT)
Dept: CARDIAC REHAB | Facility: HOSPITAL | Age: 74
Discharge: HOME | End: 2024-03-06
Attending: INTERNAL MEDICINE
Payer: COMMERCIAL

## 2024-03-06 PROCEDURE — 93798 PHYS/QHP OP CAR RHAB W/ECG: CPT

## 2024-03-08 ENCOUNTER — HOSPITAL ENCOUNTER (OUTPATIENT)
Dept: CARDIAC REHAB | Facility: HOSPITAL | Age: 74
Discharge: HOME | End: 2024-03-08
Attending: INTERNAL MEDICINE
Payer: COMMERCIAL

## 2024-03-08 PROCEDURE — 93798 PHYS/QHP OP CAR RHAB W/ECG: CPT

## 2024-03-11 ENCOUNTER — HOSPITAL ENCOUNTER (OUTPATIENT)
Dept: CARDIAC REHAB | Facility: HOSPITAL | Age: 74
Discharge: HOME | End: 2024-03-11
Attending: INTERNAL MEDICINE
Payer: COMMERCIAL

## 2024-03-11 PROCEDURE — 93798 PHYS/QHP OP CAR RHAB W/ECG: CPT

## 2024-03-13 ENCOUNTER — HOSPITAL ENCOUNTER (OUTPATIENT)
Dept: CARDIAC REHAB | Facility: HOSPITAL | Age: 74
Discharge: HOME | End: 2024-03-13
Attending: INTERNAL MEDICINE
Payer: COMMERCIAL

## 2024-03-13 PROCEDURE — 93798 PHYS/QHP OP CAR RHAB W/ECG: CPT

## 2024-03-15 ENCOUNTER — HOSPITAL ENCOUNTER (OUTPATIENT)
Dept: CARDIAC REHAB | Facility: HOSPITAL | Age: 74
Discharge: HOME | End: 2024-03-15
Attending: INTERNAL MEDICINE
Payer: COMMERCIAL

## 2024-03-15 PROCEDURE — 93798 PHYS/QHP OP CAR RHAB W/ECG: CPT

## 2024-03-18 ENCOUNTER — HOSPITAL ENCOUNTER (OUTPATIENT)
Dept: CARDIAC REHAB | Facility: HOSPITAL | Age: 74
Discharge: HOME | End: 2024-03-18
Attending: INTERNAL MEDICINE
Payer: COMMERCIAL

## 2024-03-18 PROCEDURE — 93798 PHYS/QHP OP CAR RHAB W/ECG: CPT

## 2024-03-20 ENCOUNTER — HOSPITAL ENCOUNTER (OUTPATIENT)
Dept: CARDIAC REHAB | Facility: HOSPITAL | Age: 74
Discharge: HOME | End: 2024-03-20
Attending: INTERNAL MEDICINE
Payer: COMMERCIAL

## 2024-03-20 PROCEDURE — 93798 PHYS/QHP OP CAR RHAB W/ECG: CPT

## 2024-03-22 ENCOUNTER — HOSPITAL ENCOUNTER (OUTPATIENT)
Dept: CARDIAC REHAB | Facility: HOSPITAL | Age: 74
Discharge: HOME | End: 2024-03-22
Attending: INTERNAL MEDICINE
Payer: COMMERCIAL

## 2024-03-22 PROCEDURE — 93798 PHYS/QHP OP CAR RHAB W/ECG: CPT

## 2024-03-25 ENCOUNTER — HOSPITAL ENCOUNTER (OUTPATIENT)
Dept: CARDIAC REHAB | Facility: HOSPITAL | Age: 74
Discharge: HOME | End: 2024-03-25
Attending: INTERNAL MEDICINE
Payer: COMMERCIAL

## 2024-03-25 PROCEDURE — 93798 PHYS/QHP OP CAR RHAB W/ECG: CPT

## 2024-03-29 ENCOUNTER — HOSPITAL ENCOUNTER (OUTPATIENT)
Dept: CARDIAC REHAB | Facility: HOSPITAL | Age: 74
Discharge: HOME | End: 2024-03-29
Attending: INTERNAL MEDICINE
Payer: COMMERCIAL

## 2024-03-29 PROCEDURE — 93798 PHYS/QHP OP CAR RHAB W/ECG: CPT

## 2024-03-29 PROCEDURE — 93797 PHYS/QHP OP CAR RHAB WO ECG: CPT

## 2024-07-21 ENCOUNTER — HEALTH MAINTENANCE LETTER (OUTPATIENT)
Age: 74
End: 2024-07-21

## 2024-09-29 ENCOUNTER — HEALTH MAINTENANCE LETTER (OUTPATIENT)
Age: 74
End: 2024-09-29

## 2025-02-15 ENCOUNTER — HEALTH MAINTENANCE LETTER (OUTPATIENT)
Age: 75
End: 2025-02-15

## 2025-08-31 ENCOUNTER — HEALTH MAINTENANCE LETTER (OUTPATIENT)
Age: 75
End: 2025-08-31